# Patient Record
Sex: FEMALE | Race: WHITE | NOT HISPANIC OR LATINO | Employment: FULL TIME | URBAN - METROPOLITAN AREA
[De-identification: names, ages, dates, MRNs, and addresses within clinical notes are randomized per-mention and may not be internally consistent; named-entity substitution may affect disease eponyms.]

---

## 2019-01-25 ENCOUNTER — HOSPITAL ENCOUNTER (EMERGENCY)
Facility: CLINIC | Age: 44
Discharge: HOME | End: 2019-01-25
Attending: PHYSICIAN ASSISTANT | Admitting: PHYSICIAN ASSISTANT
Payer: COMMERCIAL

## 2019-01-25 VITALS
SYSTOLIC BLOOD PRESSURE: 151 MMHG | TEMPERATURE: 98 F | OXYGEN SATURATION: 99 % | BODY MASS INDEX: 33.12 KG/M2 | HEIGHT: 64 IN | DIASTOLIC BLOOD PRESSURE: 85 MMHG | RESPIRATION RATE: 16 BRPM | WEIGHT: 194 LBS

## 2019-01-25 DIAGNOSIS — J06.9 VIRAL URI WITH COUGH: ICD-10-CM

## 2019-01-25 DIAGNOSIS — R07.0 THROAT PAIN: ICD-10-CM

## 2019-01-25 DIAGNOSIS — R49.0 VOICE HOARSENESS: ICD-10-CM

## 2019-01-25 LAB
INTERNAL QC OK POCT: YES
S PYO AG THROAT QL IA.RAPID: NEGATIVE

## 2019-01-25 PROCEDURE — 99214 OFFICE O/P EST MOD 30 MIN: CPT | Mod: Z6 | Performed by: PHYSICIAN ASSISTANT

## 2019-01-25 PROCEDURE — G0463 HOSPITAL OUTPT CLINIC VISIT: HCPCS | Performed by: PHYSICIAN ASSISTANT

## 2019-01-25 PROCEDURE — 87880 STREP A ASSAY W/OPTIC: CPT | Performed by: PHYSICIAN ASSISTANT

## 2019-01-25 PROCEDURE — 87081 CULTURE SCREEN ONLY: CPT | Performed by: PHYSICIAN ASSISTANT

## 2019-01-25 ASSESSMENT — ENCOUNTER SYMPTOMS
SORE THROAT: 1
VOMITING: 0
SHORTNESS OF BREATH: 0
ABDOMINAL PAIN: 0
DIARRHEA: 0
STRIDOR: 0
NAUSEA: 0
COUGH: 1
FATIGUE: 1
WHEEZING: 0

## 2019-01-25 ASSESSMENT — MIFFLIN-ST. JEOR: SCORE: 1517.04

## 2019-01-25 NOTE — LETTER
January 25, 2019      To Whom It May Concern:      Emilie Kruse was seen in our urgent care department today, 01/25/19. Please excuse her from work tomorrow due to illness. Patient can return to work on 1/27/19 as long as she is fever.     Sincerely,        Meghan Hernandez PA-C

## 2019-01-25 NOTE — ED AVS SNAPSHOT
[facility] Emergency Department  [address] [address]  Phone:  [phone]  Fax:  [phone]                                    Emilie Kruse   MRN: [id number]    Department:  [facility] Emergency Department   Date of Visit:  1/25/2019           After Visit Summary Signature Page    I have received my discharge instructions, and my questions have been answered. I have discussed any challenges I see with this plan with the nurse or doctor.    ..........................................................................................................................................  Patient/Patient Representative Signature      ..........................................................................................................................................  Patient Representative Print Name and Relationship to Patient    ..................................................               ................................................  Date                                   Time    ..........................................................................................................................................  Reviewed by Signature/Title    ...................................................              ..............................................  Date                                               Time          22EPIC Rev 08/18

## 2019-01-26 NOTE — ED PROVIDER NOTES
History   No chief complaint on file.    [facility]  Emilie Kruse is a 43 year old female who presents to the urgent care with     ENT Symptoms             Symptoms: cc Present Absent Comment   Fever/Chills   x    Fatigue  x     Muscle Aches   x    Eye Irritation   x    Sneezing   x    Nasal Dom/Drg   x    Sinus Pressure/Pain   x    Loss of smell   x    Dental pain   x    Sore Throat  x  With hoarse voice    Swollen Glands   x    Ear Pain/Fullness  x  Fullness    Cough  x  dry   Wheeze   x    Chest Pain   x    Shortness of breath   x    Rash   x    Other          Symptom duration:  2 weeks    Symptom severity:  mild    Treatments tried:  over the counter medications, tylenol/ibuprofen   Contacts:  patient works at a .        Problem list, Medication list, Allergies, and Medical/Social/Surgical histories reviewed in [facility] and updated as appropriate.        Allergies:  Allergies   Allergen Reactions     Percocet [Oxycodone-Acetaminophen]        Problem List:    There are no active problems to display for this patient.       Past Medical History:    History reviewed. No pertinent past medical history.    Past Surgical History:    History reviewed. No pertinent surgical history.    Family History:    No family history on file.    Social History:  Marital Status:   [2]  Social History     Tobacco Use     Smoking status: Never Smoker   Substance Use Topics     Alcohol use: No     Drug use: No        Medications:      albuterol (PROAIR HFA/PROVENTIL HFA/VENTOLIN HFA) 108 (90 BASE) MCG/ACT Inhaler   ASPIRIN NOT PRESCRIBED, INTENTIONAL,   LISINOPRIL PO   metformin (GLUCOPHAGE) 1000 MG tablet         Review of Systems   Constitutional: Positive for fatigue.   HENT: Positive for sore throat.         Hoarse voice    Respiratory: Positive for cough. Negative for shortness of breath, wheezing and stridor.    Cardiovascular: Negative for chest pain.   Gastrointestinal: Negative for abdominal pain, diarrhea, nausea  "and vomiting.   Skin: Negative for rash.   All other systems reviewed and are negative.      Physical Exam   BP: 151/85  Heart Rate: 77  Temp: 98  F (36.7  C)  Resp: 16  Height: 161.3 cm (5' 3.5\")  Weight: 88 kg (194 lb)  SpO2: 99 %      Physical Exam     Constitutional: healthy, alert, no distress, cooperative and smiling   Cardiovascular: RRR. No murmurs, clicks gallops or rub  Respiratory: Lungs clear to auscultation bilaterally. No rales, rhonchi, wheezing appreciated. No accessory muscle use or retractions.   ENT: bilateral TM normal without fluid or infection, neck has bilateral anterior cervical nodes enlarged, pharynx erythematous without exudate, sinuses nontender, post nasal drip noted and nasal mucosa congested. Positive hoarse voice.   Abdomen: Abdomen soft, non-tender. BS normal. No masses, organomegaly  SKIN: no suspicious lesions or rashes    Results for orders placed or performed during the hospital encounter of 01/25/19 (from the past 24 hour(s))   Rapid strep group A screen POCT   Result Value Ref [facility]    Rapid Strep A Screen negative neg    Internal QC OK Yes          ED Course        Procedures              Critical Care time:  none               Results for orders placed or performed during the hospital encounter of 01/25/19 (from the past 24 hour(s))   Rapid strep group A screen POCT   Result Value Ref [facility]    Rapid Strep A Screen negative neg    Internal QC OK Yes        Medications - No data to display    Assessments & Plan (with Medical Decision Making)     I have reviewed the nursing notes.    I have reviewed the findings, diagnosis, plan and need for follow up with the patient.    49-year-old female-year-old male presents the urgent care with 43-year-old female presents the urgent care with sore throat, hoarse voice, and cough.  The past 2 weeks.  Patient is a  provider.  Patient denies any fevers, shortness of breath, wheezing, abdominal pain, nausea or vomiting, rash, headaches, " sinus pain or pressure.  Rapid strep was obtained in office and was negative currently pending.  See exam findings above.  Patient informed that this is likely negative and no antibiotics indicated at this time.  Symptomatic treatment discussed with patient and given on discharge paperwork.  Patient return if symptoms worsen or change, or fevers occur.       Medication List      There are no discharge medications for this visit.         Final diagnoses:   Voice hoarseness   Throat pain   Viral URI with cough       1/25/2019   [facility] EMERGENCY DEPARTMENT    Meghan Hernandez PA-C  01/25/19 2020

## 2019-01-26 NOTE — DISCHARGE INSTRUCTIONS
Reassurance given to patient.  No evidence for bacterial etiology at this time. Throat culture pending.  Patient informed to rest, warm compresses over sinuses as needed, stay hydrated, cool humidifier, salt water gargles.  Lots of rest and fluids.  Tylenol or ibuprofen as needed.  OTC cough/cold product of patient's choice PRN and fluids and rest  Return if any worsening symptoms or if not improving.  Go to Emergency Room if SOB, chest pain, painful breathing, worst headache of life, active abdominal pain, or fevers > 104F occur.     Patient voiced understanding of instructions given.

## 2019-01-27 LAB
BACTERIA SPEC CULT: NORMAL
Lab: NORMAL
SPECIMEN SOURCE: NORMAL

## 2019-01-27 NOTE — RESULT ENCOUNTER NOTE
Final Beta strep group A r/o culture is NEGATIVE for Group A streptococcus.    No treatment or change in treatment per [facility]sie Strep protocol.

## 2020-11-14 ENCOUNTER — HEALTH MAINTENANCE LETTER (OUTPATIENT)
Age: 45
End: 2020-11-14

## 2021-01-06 ENCOUNTER — VIRTUAL VISIT (OUTPATIENT)
Dept: FAMILY MEDICINE | Facility: OTHER | Age: 46
End: 2021-01-06

## 2021-01-07 DIAGNOSIS — Z20.822 SUSPECTED COVID-19 VIRUS INFECTION: Primary | ICD-10-CM

## 2021-01-07 NOTE — PROGRESS NOTES
"Date: 2021 18:56:39  Clinician: Fantasma Gamble  Clinician NPI: [id number]  Patient: Emilie Zepeda  Patient : 1975  Patient Address: [address] [place] [id number]  Patient Phone: [phone]  Visit Protocol: URI  Patient Summary:  Emilie is a 45 year old ( : 1975 ) female who initiated a [facility] Visit for COVID-19 (Coronavirus) evaluation and screening. When asked the question \"Please sign me up to receive news, health information and promotions. \", Emilie responded \"No\".    When asked when her symptoms started, Emilie reported that she does not have any symptoms.   She denies having recent facial or sinus surgery in the past 60 days and taking antibiotic medication in the past month.    Pertinent COVID-19 (Coronavirus) information  Emilie does not work or volunteer as healthcare worker or a . In the past 14 days, Emilie has not worked or volunteered at a healthcare facility or group living setting.   In the past 14 days, she also has not lived in a congregate living setting.   Emilie has had a close contact with a laboratory-confirmed COVID-19 patient in the last 14 days. She was exposed at her work. 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   Emilie is not living in the same household with the COVID-19 positive patient. She was in an enclosed space for greater than 15 minutes with the COVID-19 patient.   During the encounter, neither were wearing masks.   Emilie has been tested for COVID-19.      Date(s) of her COVID-19 test as reported by the patient (free text): Early October my son got sent home from school " so I had to use my free saliva test from work.       Result of COVID-19 test as reported by the patient (free text): Negative       Type of test as reported by the patient (free text): Mishel Phan has not received a COVID-19 vaccine.   Pertinent medical history  Emilie has diabetes. She has been told by her provider that her diabetes is in control.   Emilie typically gets a yeast infection when she takes antibiotics. She has not used fluconazole (Diflucan) to treat previous yeast infections.   She has not been told by her provider to avoid NSAIDs.   She denies having immunosuppressive conditions (e.g., chemotherapy, HIV, organ transplant, long-term use of steroids or other immunosuppressive medications, splenectomy). She denies having congestive heart failure and severe COPD. She does not have asthma.   Emilie does not need a return to work/school note.   Emilie does not smoke or use smokeless tobacco.   She denies pregnancy and denies breastfeeding. She has menstruated in the past month.   Weight: 200 lbs    MEDICATIONS: hydrocodone-acetaminophen oral, Falmina (28) oral, celecoxib oral, atorvastatin oral, lisinopril oral, metformin oral, gabapentin oral, ALLERGIES: NKDA  Clinician Response:  Dear Emilie,   Based on your exposure to COVID-19 (coronavirus), we would like to test you for this virus.  1. Please call [phone] to schedule your visit. Explain that you were referred by [facility] to have a COVID-19 test. Be ready to share your [facility] visit ID number.  * If you need to schedule in [facility] please call [phone] or for [employer] [place] employees please call [phone].   * If you need to schedule in the [place] area please call [phone]. [employer] employees call [phone].   The following will serve as your written order for this COVID Test, ordered by me, for the indication of suspected COVID [Z20.828]: The test will be ordered in [employer], our electronic health record, after you are scheduled. It will  show as ordered and authorized by John Moffett MD.  Order: COVID-19 (coronavirus) PCR for ASYMPTOMATIC EXPOSURE testing from [facility].   If you know you have had close contact with someone who tested positive, you should be quarantined for 14 days after this exposure. You should stay in quarantine for the14 days even if the covid test is negative, the optimal time to test after exposure is 5-7 days from the exposure  Quarantine means   What should I do?  For safety, it's very important to follow these rules. Do this for 14 days after the date you were last exposed to the virus..  Stay home and away from others. Don't go to school or anywhere else. Generally quarantine means staying home from work but there are some exceptions to this. Please contact your workplace.   No hugging, kissing or shaking hands.  Don't let anyone visit.  Cover your mouth and nose with a mask, tissue or washcloth to avoid spreading germs.  Wash your hands and face often. Use soap and water.  What are the symptoms of COVID-19?  The most common symptoms are cough, fever and trouble breathing. Less common symptoms include headache, body aches, fatigue (feeling very tired), chills, sore throat, stuffy or runny nose, diarrhea (loose poop), loss of taste or smell, belly pain, and nausea or vomiting (feeling sick to your stomach or throwing up).  After 14 days, if you have still don't have symptoms, you likely don't have this virus.  If you develop symptoms, follow these guidelines.  If you're normally healthy: Please start another [facility] visit to report your symptoms. Go to [email or web address].  If you have a serious health problem (like cancer, heart failure, an organ transplant or kidney disease): Call your specialty clinic. Let them know that you might have COVID-19.  2. When it's time for your COVID test:  Stay at least 6 feet away from others. (If someone will drive you to your test, stay in the backseat, as far away from the  as you can.)  Cover  your mouth and nose with a mask, tissue or washcloth.  Go straight to the testing site. Don't make any stops on the way there or back.  Please note  Caregivers in these groups are at risk for severe illness due to COVID-19:  o People 65 years and older  o People who live in a nursing home or long-term care facility  o People with chronic disease (lung, heart, cancer, diabetes, kidney, liver, immunologic)  o People who have a weakened immune system, including those who:  Are in cancer treatment  Take medicine that weakens the immune system, such as corticosteroids  Had a bone marrow or organ transplant  Have an immune deficiency  Have poorly controlled HIV or AIDS  Are obese (body mass index of 40 or higher)  Smoke regularly  Where can I get more information?   [facility] -- About COVID-19: [email or web address]  CDC -- What to Do If You're Sick: www.cdc.gov/coronavirus/2019-ncov/about/steps-when-sick.html  [facility] -- Ending Home Isolation: www.cdc.gov/coronavirus/2019-ncov/hcp/disposition-in-home-patients.html  [facility] -- Caring for Someone: www.cdc.gov/coronavirus/2019-ncov/if-you-are-sick/care-for-someone.html  [facility] -- Interim Guidance for Hospital Discharge to Home: www.health.[facility].[place]/diseases/coronavirus/hcp/hospdischarge.pdf  [facility] clinical trials (COVID-19 research studies): clinicalaffairs.[facility]-clinical-trials  Below are the COVID-19 hotlines at the [facility] ([facility]). Interpreters are available.  For health questions: Call 511-968-3675 or 1-493.837.4761 (7 a.m. to 7 p.m.)  For questions about schools and childcare: Call 146-650-2662 or 1-783.839.3259 (7 a.m. to 7 p.m.)    Diagnosis: Contact with and (suspected) exposure to other viral communicable diseases  Diagnosis ICD: Z20.828  Additional Clinician Notes:  Please do not schedule your test until 4-7 days have passed since you were exposed to the positive tested individual or you may be tested too early  and have a false negative test result.&nbsp;

## 2021-01-10 DIAGNOSIS — Z20.822 ENCOUNTER FOR LABORATORY TESTING FOR COVID-19 VIRUS: Primary | ICD-10-CM

## 2021-01-10 DIAGNOSIS — Z20.822 SUSPECTED COVID-19 VIRUS INFECTION: ICD-10-CM

## 2021-01-10 LAB
SARS-COV-2 RNA RESP QL NAA+PROBE: NORMAL
SPECIMEN SOURCE: NORMAL

## 2021-01-10 PROCEDURE — U0005 INFEC AGEN DETEC AMPLI PROBE: HCPCS | Performed by: FAMILY MEDICINE

## 2021-01-10 PROCEDURE — U0003 INFECTIOUS AGENT DETECTION BY NUCLEIC ACID (DNA OR RNA); SEVERE ACUTE RESPIRATORY SYNDROME CORONAVIRUS 2 (SARS-COV-2) (CORONAVIRUS DISEASE [COVID-19]), AMPLIFIED PROBE TECHNIQUE, MAKING USE OF HIGH THROUGHPUT TECHNOLOGIES AS DESCRIBED BY CMS-2020-01-R: HCPCS | Performed by: FAMILY MEDICINE

## 2021-01-11 ENCOUNTER — TELEPHONE (OUTPATIENT)
Dept: FAMILY MEDICINE | Facility: CLINIC | Age: 46
End: 2021-01-11

## 2021-01-11 LAB
LABORATORY COMMENT REPORT: ABNORMAL
SARS-COV-2 RNA RESP QL NAA+PROBE: POSITIVE
SPECIMEN SOURCE: ABNORMAL

## 2021-01-11 NOTE — TELEPHONE ENCOUNTER
"Coronavirus (COVID-19) Notification    Caller Name (Patient, parent, daughter/son, grandparent, etc)  Emilie Kruse    Reason for call  Notify of Positive Coronavirus (COVID-19) lab results, assess symptoms,  review  [facility] recommendations    Lab Result    Lab test:  2019-nCoV rRt-PCR or SARS-CoV-2 PCR    Oropharyngeal AND/OR nasopharyngeal swabs is POSITIVE for 2019-nCoV RNA/SARS-COV-2 PCR (COVID-19 virus)    RN Recommendations/Instructions per [facility] Coronavirus COVID-19 recommendations    Brief introduction script  Introduce self then review script:  \"I am calling on behalf of [employer].  We were notified that your Coronavirus test (COVID-19) for was POSITIVE for the virus.  I have some information to relay to you but first I wanted to mention that the [place] Dept of Health will be contacting you shortly [it's possible MD already called Patient] to talk to you more about how you are feeling and other people you have had contact with who might now also have the virus.  Also,  [employer] is Partnering with the [facility] for Covid-19 research, you may be contacted directly by research staff.\"    Assessment (Inquire about Patient's current symptoms)   Assessment   Current Symptoms at time of phone call: (if no symptoms, document No symptoms] None    Symptoms onset (if applicable) 1/10/2021     If at time of call, Patients symptoms hare worsened, the Patient should contact 911 or have someone drive them to Emergency Dept promptly:      If Patient calling 911, inform 911 personal that you have tested positive for the Coronavirus (COVID-19).  Place mask on and await 911 to arrive.    If Emergency Dept, If possible, please have another adult drive you to the Emergency Dept but you need to wear mask when in contact with other people.      Monoclonal Antibody Administration    You may be eligible to receive a new treatment with a monoclonal antibody for preventing hospitalization in " "patients at high risk for complications from COVID-19.   This medication is still experimental and available on a limited basis; it is given through an IV and must be given at an infusion center. Please note that not all people who are eligible will receive the medication since it is in limited supply.     Are you interested in being considered for this medication?  No.   Does the patient fit the criteria: Patient declined    If patient qualifies based on above criteria:  \"We will contact you if you are selected to receive the medication in the next 1-2 days.   This is time sensitive and if you are not selected in the next 1-2 days, you will not receive the medication.  If you do not receive a call to schedule, you have not been selected.\"    Review information with Patient    Your result was positive. This means you have COVID-19 (coronavirus).  We have sent you a letter that reviews the information that I'll be reviewing with you now.    How can I protect others?    If you have symptoms: stay home and away from others (self-isolate) until:    You've had no fever--and no medicine that reduces fever--for 1 full day (24 hours). And       Your other symptoms have gotten better. For example, your cough or breathing has improved. And     At least 10 days have passed since your symptoms started. (If you've been told by a doctor that you have a weak immune system, wait 20 days.)     If you don't have symptoms: Stay home and away from others (self-isolate) until at least 10 days have passed since your first positive COVID-19 test. (Date test collected)    During this time:    Stay in your own room, including for meals. Use your own bathroom if you can.    Stay away from others in your home. No hugging, kissing or shaking hands. No visitors.     Don't go to work, school or anywhere else.     Clean  high touch  surfaces often (doorknobs, counters, handles, etc.). Use a household cleaning spray or wipes. You'll find a full " list on the EPA website at www.epa.gov/pesticide-registration/list-n-disinfectants-use-against-sars-cov-2.     Cover your mouth and nose with a mask, tissue or other face covering to avoid spreading germs.    Wash your hands and face often with soap and water.    Caregivers in these groups are at risk for severe illness due to COVID-19:  o People 65 years and older  o People who live in a nursing home or long-term care facility  o People with chronic disease (lung, heart, cancer, diabetes, kidney, liver, immunologic)  o People who have a weakened immune system, including those who:  - Are in cancer treatment  - Take medicine that weakens the immune system, such as corticosteroids  - Had a bone marrow or organ transplant  - Have an immune deficiency  - Have poorly controlled HIV or AIDS  - Are obese (body mass index of 40 or higher)  - Smoke regularly    Caregivers should wear gloves while washing dishes, handling laundry and cleaning bedrooms and bathrooms.    Wash and dry laundry with special caution. Don't shake dirty laundry, and use the warmest water setting you can.    If you have a weakened immune system, ask your doctor about other actions you should take.    For more tips, go to www.cdc.gov/coronavirus/2019-ncov/downloads/10Things.pdf.    You should not go back to work until you meet the guidelines above for ending your home isolation. You don't need to be retested for COVID-19 before going back to work--studies show that you won't spread the virus if it's been at least 10 days since your symptoms started (or 20 days, if you have a weak immune system).    Employers: This document serves as formal notice of your employee's medical guidelines for going back to work. They must meet the above guidelines before going back to work in person.    How can I take care of myself?    1. Get lots of rest. Drink extra fluids (unless a doctor has told you not to).    2. Take Tylenol (acetaminophen) for fever or pain. If  you have liver or kidney problems, ask your family doctor if it's okay to take Tylenol.     Take either:     650 mg (two 325 mg pills) every 4 to 6 hours, or     1,000 mg (two 500 mg pills) every 8 hours as needed.     Note: Don't take more than 3,000 mg in one day. Acetaminophen is found in many medicines (both prescribed and over-the-counter medicines). Read all labels to be sure you don't take too much.    For children, check the Tylenol bottle for the right dose (based on their age or weight).    3. If you have other health problems (like cancer, heart failure, an organ transplant or severe kidney disease): Call your specialty clinic if you don't feel better in the next 2 days.    4. Know when to call 911: Emergency warning signs include:    Trouble breathing or shortness of breath    Pain or pressure in the chest that doesn't go away    Feeling confused like you haven't felt before, or not being able to wake up    Bluish-colored lips or face    5. Sign up for [employer]. We know it's scary to hear that you have COVID-19. We want to track your symptoms to make sure you're okay over the next 2 weeks. Please look for an email from [employer]--this is a free, online program that we'll use to keep in touch. To sign up, follow the link in the email. Learn more at [email or web address].    Where can I get more information?    [facility]: [email or web address]    Coronavirus Basics: www.health.[facility].mn.us/diseases/coronavirus/basics.html    What to Do If You're Sick: www.cdc.gov/coronavirus/2019-ncov/about/steps-when-sick.html    Ending Home Isolation: www.cdc.gov/coronavirus/2019-ncov/hcp/disposition-in-home-patients.html     Caring for Someone with COVID-19: www.cdc.gov/coronavirus/2019-ncov/if-you-are-sick/care-for-someone.html     [facility] clinical trials (COVID-19 research studies): clinicalaffairs.[facility]/umn-clinical-trials     A Positive COVID-19 letter will be sent via  [facility] or the mail. (Exception, no letters sent to Presurgerical/Preprocedure Patients)    Angie Vidal LPN

## 2021-04-11 ENCOUNTER — APPOINTMENT (OUTPATIENT)
Dept: GENERAL RADIOLOGY | Facility: CLINIC | Age: 46
End: 2021-04-11
Attending: FAMILY MEDICINE
Payer: COMMERCIAL

## 2021-04-11 ENCOUNTER — HOSPITAL ENCOUNTER (EMERGENCY)
Facility: CLINIC | Age: 46
Discharge: HOME | End: 2021-04-11
Attending: FAMILY MEDICINE | Admitting: FAMILY MEDICINE
Payer: COMMERCIAL

## 2021-04-11 VITALS
BODY MASS INDEX: 34.87 KG/M2 | DIASTOLIC BLOOD PRESSURE: 81 MMHG | SYSTOLIC BLOOD PRESSURE: 153 MMHG | WEIGHT: 200 LBS | TEMPERATURE: 98.4 F | HEART RATE: 100 BPM | RESPIRATION RATE: 18 BRPM | OXYGEN SATURATION: 96 %

## 2021-04-11 DIAGNOSIS — R11.2 NAUSEA AND VOMITING, INTRACTABILITY OF VOMITING NOT SPECIFIED, UNSPECIFIED VOMITING TYPE: ICD-10-CM

## 2021-04-11 DIAGNOSIS — G89.18 POSTOPERATIVE PAIN: ICD-10-CM

## 2021-04-11 DIAGNOSIS — R79.89 ELEVATED LFTS: ICD-10-CM

## 2021-04-11 PROBLEM — M43.10 SPONDYLOLISTHESIS: Status: ACTIVE | Noted: 2021-04-07

## 2021-04-11 PROBLEM — R87.619 ATYPICAL GLANDULAR CELLS ON CERVICAL PAP SMEAR: Status: ACTIVE | Noted: 2017-04-21

## 2021-04-11 PROBLEM — M48.062 SPINAL STENOSIS, LUMBAR REGION, WITH NEUROGENIC CLAUDICATION: Status: ACTIVE | Noted: 2021-04-07

## 2021-04-11 LAB
ALBUMIN SERPL-MCNC: 3 G/DL (ref 3.4–5)
ALBUMIN UR-MCNC: NEGATIVE MG/DL
ALBUMIN UR-MCNC: NEGATIVE MG/DL
ALP SERPL-CCNC: 279 U/L (ref 40–150)
ALT SERPL W P-5'-P-CCNC: 688 U/L (ref 0–50)
ANION GAP SERPL CALCULATED.3IONS-SCNC: 5 MMOL/L (ref 3–14)
APPEARANCE UR: ABNORMAL
APPEARANCE UR: CLEAR
AST SERPL W P-5'-P-CCNC: 305 U/L (ref 0–45)
BACTERIA #/AREA URNS HPF: ABNORMAL /HPF
BASOPHILS # BLD AUTO: 0 10E9/L (ref 0–0.2)
BASOPHILS NFR BLD AUTO: 0.3 %
BILIRUB SERPL-MCNC: 2.1 MG/DL (ref 0.2–1.3)
BILIRUB UR QL STRIP: NEGATIVE
BILIRUB UR QL STRIP: NEGATIVE
BUN SERPL-MCNC: 14 MG/DL (ref 7–30)
CALCIUM SERPL-MCNC: 8.5 MG/DL (ref 8.5–10.1)
CHLORIDE SERPL-SCNC: 106 MMOL/L (ref 94–109)
CO2 SERPL-SCNC: 26 MMOL/L (ref 20–32)
COLOR UR AUTO: YELLOW
COLOR UR AUTO: YELLOW
CREAT SERPL-MCNC: 0.61 MG/DL (ref 0.52–1.04)
DIFFERENTIAL METHOD BLD: NORMAL
EOSINOPHIL # BLD AUTO: 0.1 10E9/L (ref 0–0.7)
EOSINOPHIL NFR BLD AUTO: 0.8 %
ERYTHROCYTE [DISTWIDTH] IN BLOOD BY AUTOMATED COUNT: 11.7 % (ref 10–15)
GFR SERPL CREATININE-BSD FRML MDRD: >90 ML/MIN/{1.73_M2}
GLUCOSE SERPL-MCNC: 180 MG/DL (ref 70–99)
GLUCOSE UR STRIP-MCNC: 50 MG/DL
GLUCOSE UR STRIP-MCNC: NEGATIVE MG/DL
HCT VFR BLD AUTO: 36.4 % (ref 35–47)
HGB BLD-MCNC: 12.8 G/DL (ref 11.7–15.7)
HGB UR QL STRIP: ABNORMAL
HGB UR QL STRIP: ABNORMAL
IMM GRANULOCYTES # BLD: 0.1 10E9/L (ref 0–0.4)
IMM GRANULOCYTES NFR BLD: 0.6 %
INR PPP: 1.04 (ref 0.86–1.14)
KETONES UR STRIP-MCNC: NEGATIVE MG/DL
KETONES UR STRIP-MCNC: NEGATIVE MG/DL
LACTATE BLD-SCNC: 1.5 MMOL/L (ref 0.7–2)
LEUKOCYTE ESTERASE UR QL STRIP: NEGATIVE
LEUKOCYTE ESTERASE UR QL STRIP: NEGATIVE
LYMPHOCYTES # BLD AUTO: 1.2 10E9/L (ref 0.8–5.3)
LYMPHOCYTES NFR BLD AUTO: 13 %
MCH RBC QN AUTO: 31.9 PG (ref 26.5–33)
MCHC RBC AUTO-ENTMCNC: 35.2 G/DL (ref 31.5–36.5)
MCV RBC AUTO: 91 FL (ref 78–100)
MONOCYTES # BLD AUTO: 0.7 10E9/L (ref 0–1.3)
MONOCYTES NFR BLD AUTO: 7.4 %
MUCOUS THREADS #/AREA URNS LPF: PRESENT /LPF
NEUTROPHILS # BLD AUTO: 6.9 10E9/L (ref 1.6–8.3)
NEUTROPHILS NFR BLD AUTO: 77.9 %
NITRATE UR QL: NEGATIVE
NITRATE UR QL: NEGATIVE
NRBC # BLD AUTO: 0 10*3/UL
NRBC BLD AUTO-RTO: 0 /100
PH UR STRIP: 7 PH (ref 5–7)
PH UR STRIP: 8 PH (ref 5–7)
PLATELET # BLD AUTO: 290 10E9/L (ref 150–450)
POTASSIUM SERPL-SCNC: 3.7 MMOL/L (ref 3.4–5.3)
PROT SERPL-MCNC: 6.8 G/DL (ref 6.8–8.8)
RBC # BLD AUTO: 4.01 10E12/L (ref 3.8–5.2)
RBC #/AREA URNS AUTO: 0 /HPF (ref 0–2)
RBC #/AREA URNS AUTO: 2 /HPF (ref 0–2)
SODIUM SERPL-SCNC: 137 MMOL/L (ref 133–144)
SOURCE: ABNORMAL
SOURCE: ABNORMAL
SP GR UR STRIP: 1 (ref 1–1.03)
SP GR UR STRIP: 1.01 (ref 1–1.03)
SQUAMOUS #/AREA URNS AUTO: 1 /HPF (ref 0–1)
SQUAMOUS #/AREA URNS AUTO: 8 /HPF (ref 0–1)
UROBILINOGEN UR STRIP-MCNC: 0 MG/DL (ref 0–2)
UROBILINOGEN UR STRIP-MCNC: 4 MG/DL (ref 0–2)
WBC # BLD AUTO: 8.9 10E9/L (ref 4–11)
WBC #/AREA URNS AUTO: 1 /HPF (ref 0–5)
WBC #/AREA URNS AUTO: 6 /HPF (ref 0–5)

## 2021-04-11 PROCEDURE — 85610 PROTHROMBIN TIME: CPT | Performed by: FAMILY MEDICINE

## 2021-04-11 PROCEDURE — 87040 BLOOD CULTURE FOR BACTERIA: CPT | Performed by: FAMILY MEDICINE

## 2021-04-11 PROCEDURE — 93005 ELECTROCARDIOGRAM TRACING: CPT | Performed by: FAMILY MEDICINE

## 2021-04-11 PROCEDURE — 71046 X-RAY EXAM CHEST 2 VIEWS: CPT

## 2021-04-11 PROCEDURE — 99285 EMERGENCY DEPT VISIT HI MDM: CPT | Mod: 25 | Performed by: FAMILY MEDICINE

## 2021-04-11 PROCEDURE — 96374 THER/PROPH/DIAG INJ IV PUSH: CPT | Performed by: FAMILY MEDICINE

## 2021-04-11 PROCEDURE — 81001 URINALYSIS AUTO W/SCOPE: CPT | Performed by: FAMILY MEDICINE

## 2021-04-11 PROCEDURE — 80053 COMPREHEN METABOLIC PANEL: CPT | Performed by: FAMILY MEDICINE

## 2021-04-11 PROCEDURE — 85025 COMPLETE CBC W/AUTO DIFF WBC: CPT | Performed by: FAMILY MEDICINE

## 2021-04-11 PROCEDURE — 93010 ELECTROCARDIOGRAM REPORT: CPT | Performed by: FAMILY MEDICINE

## 2021-04-11 PROCEDURE — 87086 URINE CULTURE/COLONY COUNT: CPT | Performed by: FAMILY MEDICINE

## 2021-04-11 PROCEDURE — 250N000011 HC RX IP 250 OP 636: Performed by: FAMILY MEDICINE

## 2021-04-11 PROCEDURE — 258N000003 HC RX IP 258 OP 636: Performed by: FAMILY MEDICINE

## 2021-04-11 PROCEDURE — 83605 ASSAY OF LACTIC ACID: CPT | Performed by: FAMILY MEDICINE

## 2021-04-11 PROCEDURE — 96361 HYDRATE IV INFUSION ADD-ON: CPT | Performed by: FAMILY MEDICINE

## 2021-04-11 RX ORDER — SODIUM CHLORIDE, SODIUM LACTATE, POTASSIUM CHLORIDE, CALCIUM CHLORIDE 600; 310; 30; 20 MG/100ML; MG/100ML; MG/100ML; MG/100ML
INJECTION, SOLUTION INTRAVENOUS CONTINUOUS
Status: DISCONTINUED | OUTPATIENT
Start: 2021-04-11 | End: 2021-04-11 | Stop reason: HOSPADM

## 2021-04-11 RX ORDER — ONDANSETRON 4 MG/1
4 TABLET, ORALLY DISINTEGRATING ORAL EVERY 8 HOURS PRN
Qty: 20 TABLET | Refills: 0 | Status: SHIPPED | OUTPATIENT
Start: 2021-04-11 | End: 2021-04-14

## 2021-04-11 RX ORDER — OXYCODONE HYDROCHLORIDE 5 MG/1
5-15 TABLET ORAL EVERY 6 HOURS PRN
Qty: 20 TABLET | Refills: 0 | Status: SHIPPED | OUTPATIENT
Start: 2021-04-11 | End: 2024-03-07

## 2021-04-11 RX ORDER — ONDANSETRON 2 MG/ML
4 INJECTION INTRAMUSCULAR; INTRAVENOUS
Status: COMPLETED | OUTPATIENT
Start: 2021-04-11 | End: 2021-04-11

## 2021-04-11 RX ADMIN — ONDANSETRON 4 MG: 2 INJECTION INTRAMUSCULAR; INTRAVENOUS at 11:45

## 2021-04-11 RX ADMIN — SODIUM CHLORIDE, POTASSIUM CHLORIDE, SODIUM LACTATE AND CALCIUM CHLORIDE 1000 ML: 600; 310; 30; 20 INJECTION, SOLUTION INTRAVENOUS at 11:45

## 2021-04-11 NOTE — DISCHARGE INSTRUCTIONS
Push fluids, rest.  Stop taking Tylenol.  You should have a recheck of your liver function tests in 1 to 2 weeks with your primary care provider.  Oxycodone as we discussed may be increased to 5-15 mg every 6 hours as needed.  Call and check with your surgeon as planned tomorrow.  Return to the emergency department in the interim if worse or changes.

## 2021-04-11 NOTE — ED TRIAGE NOTES
Nausea/vomiting and fever since last night.  Patient had spinal fusion on Wednesday and was discharged on Friday.

## 2021-04-11 NOTE — ED PROVIDER NOTES
History     Chief Complaint   Patient presents with     Post-op Problem     Vomiting     HPI  Emilie Kruse is a 45 year old female, past medical history is significant for spondylolisthesis, spinal stenosis, hypercholesterolemia, type 2 diabetes, endometriosis, hypertension, class II severe obesity, who underwent recent anterior lumbar fusion L5-S1, posterior spine fusion L5-S1, bilateral decompression laminectomy L5-S1, instrumentation and bone graft on 4/7/2021 with Dr. Terrence Elam at [facility], and presents now with concerns of vomiting and fever.  History is obtained from the patient who is brought in by her significant other with concerns of nausea yesterday vomiting fever onset yesterday afternoon 100.6.  She denies any cough runny nose or sore throat.  She admits she is not drinking fluids the way she should be and notes decreased urine output.  Her pain has been managed with a combination of Tylenol and oxycodone which she has been able to keep down and reports adequate pain relief.  Has had a couple of showers at home since the time of the surgery.  She called [facility] and was directed to the emergency department here.    Allergies:  Allergies   Allergen Reactions     Percocet [Oxycodone-Acetaminophen]        Problem List:    Patient Active Problem List    Diagnosis Date Noted     Mild or unspecified pre-eclampsia, antepartum 04/11/2021     Priority: Medium     Formatting of this note might be different from the original.  past hx of both pregnancies       Spinal stenosis, lumbar region, with neurogenic claudication 04/07/2021     Priority: Medium     Spondylolisthesis 04/07/2021     Priority: Medium     Atypical glandular cells on cervical Pap smear 04/21/2017     Priority: Medium     Formatting of this note might be different from the original.  Colposcopy, endometrial biosy - May 2017 - negative.  Pap smear, HPV testing in 12 months, 24 months.  If abnormal,  repeat colposcopy.  Formatting of this note might be different from the original.  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  Diagnosed in 2009. On Metformin and lantus.  No microvascular complications       Endometriosis of ovary 05/25/2012     Priority: Medium     Endometrioma 04/04/2012     Priority: Medium     Family history of ovarian cancer 04/04/2012     Priority: Medium     Essential hypertension 10/28/2010     Priority: Medium        Past Medical History:    No past medical history on file.    Past Surgical History:    No past surgical history on file.    Family History:    No family history on file.    Social History:  Marital Status:   [2]  Social History     Tobacco Use     Smoking status: Never Smoker   Substance Use Topics     Alcohol use: No     Drug use: No        Medications:    ondansetron (ZOFRAN ODT) 4 MG ODT tab  oxyCODONE (ROXICODONE) 5 MG tablet  albuterol (PROAIR HFA/PROVENTIL HFA/VENTOLIN HFA) 108 (90 BASE) MCG/ACT Inhaler  ASPIRIN NOT PRESCRIBED, INTENTIONAL,  LISINOPRIL PO  metformin (GLUCOPHAGE) 1000 MG tablet          Review of Systems   All other systems reviewed and are negative.      Physical Exam   BP: (!) 166/87  Pulse: 100  Temp: 98.4  F (36.9  C)  Resp: 18  Weight: 90.7 kg (200 lb)  SpO2: 99 %      Physical Exam  Vitals signs and nursing note reviewed.   Constitutional:       General: She is not in acute distress.     Appearance: Normal appearance. She is obese. She is ill-appearing.   HENT:      Head: Normocephalic and atraumatic.      Right Ear: Tympanic membrane normal.      Left Ear: Tympanic membrane normal.      Nose: Nose normal.      Mouth/Throat:      Mouth: Mucous membranes are  dry.      Pharynx: Oropharynx is clear.   Eyes:      Extraocular Movements: Extraocular movements intact.      Conjunctiva/sclera: Conjunctivae normal.      Pupils: Pupils are equal, round, and reactive to light.   Neck:      Musculoskeletal: Normal range of motion and neck supple.   Cardiovascular:      Rate and Rhythm: Normal rate and regular rhythm.      Pulses: Normal pulses.      Heart sounds: Normal heart sounds.   Pulmonary:      Effort: Pulmonary effort is normal.      Breath sounds: Normal breath sounds.   Abdominal:      General: Bowel sounds are normal.      Palpations: Abdomen is soft.      Comments: Midline lower abdominal incision dressing was removed and the incision inspected and there is no evidence for infection   Musculoskeletal:        Back:    Skin:     General: Skin is warm and dry.      Capillary Refill: Capillary refill takes less than 2 seconds.   Neurological:      General: No focal deficit present.      Mental Status: She is alert and oriented to person, place, and time.   Psychiatric:         Mood and Affect: Mood normal.         Behavior: Behavior normal.         ED Course        Procedures                  EKG Interpretation:      Interpreted by Erasmo Cooper MD  Time reviewed: Time obtained 1126 time interpreted same.  81 bpm, normal sinus rhythm, normal axis, normal intervals, no acute ST-T wave changes.  Impression normal EKG.      Critical Care time:  none               Results for orders placed or performed during the hospital encounter of 04/11/21 (from the past 24 hour(s))   CBC with platelets differential   Result Value Ref Range    WBC 8.9 4.0 - 11.0 10e9/L    RBC Count 4.01 3.8 - 5.2 10e12/L    Hemoglobin 12.8 11.7 - 15.7 g/dL    Hematocrit 36.4 35.0 - 47.0 %    MCV 91 78 - 100 fl    MCH 31.9 26.5 - 33.0 pg    MCHC 35.2 31.5 - 36.5 g/dL    RDW 11.7 10.0 - 15.0 %    Platelet Count 290 150 - 450 10e9/L    Diff Method Automated Method     % Neutrophils 77.9 %    %  Lymphocytes 13.0 %    % Monocytes 7.4 %    % Eosinophils 0.8 %    % Basophils 0.3 %    % Immature Granulocytes 0.6 %    Nucleated RBCs 0 0 /100    Absolute Neutrophil 6.9 1.6 - 8.3 10e9/L    Absolute Lymphocytes 1.2 0.8 - 5.3 10e9/L    Absolute Monocytes 0.7 0.0 - 1.3 10e9/L    Absolute Eosinophils 0.1 0.0 - 0.7 10e9/L    Absolute Basophils 0.0 0.0 - 0.2 10e9/L    Abs Immature Granulocytes 0.1 0 - 0.4 10e9/L    Absolute Nucleated RBC 0.0    INR   Result Value Ref Range    INR 1.04 0.86 - 1.14   Comprehensive metabolic panel   Result Value Ref Range    Sodium 137 133 - 144 mmol/L    Potassium 3.7 3.4 - 5.3 mmol/L    Chloride 106 94 - 109 mmol/L    Carbon Dioxide 26 20 - 32 mmol/L    Anion Gap 5 3 - 14 mmol/L    Glucose 180 (H) 70 - 99 mg/dL    Urea Nitrogen 14 7 - 30 mg/dL    Creatinine 0.61 0.52 - 1.04 mg/dL    GFR Estimate >90 >60 mL/min/[1.73_m2]    GFR Estimate If Black >90 >60 mL/min/[1.73_m2]    Calcium 8.5 8.5 - 10.1 mg/dL    Bilirubin Total 2.1 (H) 0.2 - 1.3 mg/dL    Albumin 3.0 (L) 3.4 - 5.0 g/dL    Protein Total 6.8 6.8 - 8.8 g/dL    Alkaline Phosphatase 279 (H) 40 - 150 U/L     (HH) 0 - 50 U/L     (H) 0 - 45 U/L   Lactic acid whole blood   Result Value Ref Range    Lactic Acid 1.5 0.7 - 2.0 mmol/L   Blood culture    Specimen: Blood    Right Arm   Result Value Ref Range    Specimen Description Blood Right Arm     Culture Micro No growth after 1 hour    UA reflex to Microscopic   Result Value Ref Range    Color Urine Yellow     Appearance Urine Slightly Cloudy     Glucose Urine 50 (A) NEG^Negative mg/dL    Bilirubin Urine Negative NEG^Negative    Ketones Urine Negative NEG^Negative mg/dL    Specific Gravity Urine 1.011 1.003 - 1.035    Blood Urine Large (A) NEG^Negative    pH Urine 7.0 5.0 - 7.0 pH    Protein Albumin Urine Negative NEG^Negative mg/dL    Urobilinogen mg/dL 4.0 (H) 0.0 - 2.0 mg/dL    Nitrite Urine Negative NEG^Negative    Leukocyte Esterase Urine Negative NEG^Negative    Source  Midstream Urine     RBC Urine 2 0 - 2 /HPF    WBC Urine 6 (H) 0 - 5 /HPF    Bacteria Urine Few (A) NEG^Negative /HPF    Squamous Epithelial /HPF Urine 8 (H) 0 - 1 /HPF    Mucous Urine Present (A) NEG^Negative /LPF   Urine Culture Aerobic Bacterial    Specimen: Midstream Urine   Result Value Ref Range    Specimen Description Midstream Urine     Special Requests Specimen received in preservative     Culture Micro PENDING    XR Chest 2 Views    Narrative    XR CHEST TWO VIEWS   4/11/2021 12:58 PM     HISTORY: Fever, POD 4.    COMPARISON: None.      Impression    IMPRESSION: PA and lateral views of the chest. Lungs are clear. Heart  is normal in size. No effusions are evident. No pneumothorax.    KITTY BAUTISTA MD   UA reflex to Microscopic   Result Value Ref Range    Color Urine Yellow     Appearance Urine Clear     Glucose Urine Negative NEG^Negative mg/dL    Bilirubin Urine Negative NEG^Negative    Ketones Urine Negative NEG^Negative mg/dL    Specific Gravity Urine 1.005 1.003 - 1.035    Blood Urine Large (A) NEG^Negative    pH Urine 8.0 (H) 5.0 - 7.0 pH    Protein Albumin Urine Negative NEG^Negative mg/dL    Urobilinogen mg/dL 0.0 0.0 - 2.0 mg/dL    Nitrite Urine Negative NEG^Negative    Leukocyte Esterase Urine Negative NEG^Negative    Source Midstream Urine     RBC Urine 0 0 - 2 /HPF    WBC Urine 1 0 - 5 /HPF    Squamous Epithelial /HPF Urine 1 0 - 1 /HPF       Medications   lactated ringers BOLUS 1,000 mL (has no administration in time range)     Followed by   lactated ringers infusion (has no administration in time range)   ondansetron (ZOFRAN) injection 4 mg (4 mg Intravenous Given 4/11/21 1145)   lactated ringers BOLUS 1,000 mL (0 mLs Intravenous Stopped 4/11/21 1402)   3:43 PM  Patient feels better after fluids and antiemetics.  She has no nausea and has been able to eat and drink here in the emergency department.  No recurrence of nausea or vomiting.  Her pain is adequately controlled at present time.  I am  concerned about her LFTs being abnormal.  I suspect that this may be related to the acetaminophen that she is taking on a fairly frequent basis since the time of her surgery and have asked her to stop taking the acetaminophen completely.  Recheck LFTs in 1 to 2 weeks.  Correspondingly I will increase her oxycodone from the 5-10 mg every 6 hours recommended she may go as high as 15 mg every 6 hours if she really needs to.  This should be short-term only as I have stressed to her.  The patient of note does not have any right upper quadrant abdominal pain/epigastric pain and has not at any time.  If her liver function tests do not normalize obviously further evaluation of her liver is warranted and I would pursue ultrasound of the hepatobiliary system at that point.  The repeat urinalysis shows no evidence for infection, her chest x-ray is clear.  She does not have any meningeal signs at this point and did not feel that further evaluation with a spinal tap for consideration of CNS infection is warranted at this point.  I did discuss this with the patient and she agrees.  She notes that when her temperature was 100.6 at home she was in a very hot room she got out of the hot room immediately with the assistance of her  and the temperature was turned down.  She has not felt hot since then and not had a fever since that time.  Patient feels clinically improved and I think it would be reasonable to disposition her home she has a plan to call her surgeon tomorrow.  At the present time I do not have evidence to support infection anywhere and I suspect that the elevated temperature at home which has not been reproduced here was a result of the environmental conditions.  Her nausea and vomiting and pain were treated to her satisfaction in the emergency department.      Assessments & Plan (with Medical Decision Making)   Assessments and plan with medical decision making at the time stamps above.      Disclaimer: This note  consists of symbols derived from keyboarding, dictation and/or voice recognition software. As a result, there may be errors in the script that have gone undetected. Please consider this when interpreting information found in this chart.      I have reviewed the nursing notes.    I have reviewed the findings, diagnosis, plan and need for follow up with the patient.       New Prescriptions    ONDANSETRON (ZOFRAN ODT) 4 MG ODT TAB    Take 1 tablet (4 mg) by mouth every 8 hours as needed    OXYCODONE (ROXICODONE) 5 MG TABLET    Take 1-3 tablets (5-15 mg) by mouth every 6 hours as needed for pain       Final diagnoses:   Postoperative pain   Nausea and vomiting, intractability of vomiting not specified, unspecified vomiting type   Elevated LFTs       4/11/2021   [facility] EMERGENCY DEPT     Erasmo Cooper MD  04/13/21 1520       Erasmo Cooper MD  04/20/21 [id number]

## 2021-04-12 LAB
BACTERIA SPEC CULT: NORMAL
Lab: NORMAL
SPECIMEN SOURCE: NORMAL

## 2021-04-12 NOTE — RESULT ENCOUNTER NOTE
Final urine culture report is negative.  Adult Negative Urine culture parameters: Any # Urogenital single or mixed organism, <10,000 col/ml single organism (cath specimen), and <50,000 col/ml single organism (midstream or cath specimen).  Pediatric Negative urine culture parameters: Any # Urogenital single or mixed organism, <50,000 col/ml single organism (cath specimen), and <100,000 col/ml single organism (midstream or cath specimen).  Treatment recommendations per [facility] ED Lab Result Urine Culture protocol.

## 2021-04-17 LAB
BACTERIA SPEC CULT: NO GROWTH
BACTERIA SPEC CULT: NO GROWTH
SPECIMEN SOURCE: NORMAL
SPECIMEN SOURCE: NORMAL

## 2021-05-23 ENCOUNTER — HEALTH MAINTENANCE LETTER (OUTPATIENT)
Age: 46
End: 2021-05-23

## 2021-07-18 ENCOUNTER — HEALTH MAINTENANCE LETTER (OUTPATIENT)
Age: 46
End: 2021-07-18

## 2021-09-12 ENCOUNTER — HEALTH MAINTENANCE LETTER (OUTPATIENT)
Age: 46
End: 2021-09-12

## 2022-01-02 ENCOUNTER — HEALTH MAINTENANCE LETTER (OUTPATIENT)
Age: 47
End: 2022-01-02

## 2022-04-24 ENCOUNTER — HEALTH MAINTENANCE LETTER (OUTPATIENT)
Age: 47
End: 2022-04-24

## 2022-08-14 ENCOUNTER — HEALTH MAINTENANCE LETTER (OUTPATIENT)
Age: 47
End: 2022-08-14

## 2022-11-19 ENCOUNTER — HEALTH MAINTENANCE LETTER (OUTPATIENT)
Age: 47
End: 2022-11-19

## 2023-04-09 ENCOUNTER — HEALTH MAINTENANCE LETTER (OUTPATIENT)
Age: 48
End: 2023-04-09

## 2023-09-09 ENCOUNTER — HEALTH MAINTENANCE LETTER (OUTPATIENT)
Age: 48
End: 2023-09-09

## 2024-01-27 ENCOUNTER — HEALTH MAINTENANCE LETTER (OUTPATIENT)
Age: 49
End: 2024-01-27

## 2024-03-07 ENCOUNTER — HOSPITAL ENCOUNTER (EMERGENCY)
Facility: CLINIC | Age: 49
Discharge: HOME | End: 2024-03-07
Attending: EMERGENCY MEDICINE | Admitting: EMERGENCY MEDICINE
Payer: COMMERCIAL

## 2024-03-07 VITALS
HEART RATE: 77 BPM | BODY MASS INDEX: 37.52 KG/M2 | OXYGEN SATURATION: 98 % | RESPIRATION RATE: 18 BRPM | TEMPERATURE: 97.5 F | SYSTOLIC BLOOD PRESSURE: 122 MMHG | WEIGHT: 215.2 LBS | DIASTOLIC BLOOD PRESSURE: 85 MMHG

## 2024-03-07 DIAGNOSIS — E16.2 HYPOGLYCEMIA: ICD-10-CM

## 2024-03-07 DIAGNOSIS — Z86.39 H/O INSULIN DEPENDENT DIABETES MELLITUS: ICD-10-CM

## 2024-03-07 DIAGNOSIS — E87.6 HYPOKALEMIA: ICD-10-CM

## 2024-03-07 LAB
ALBUMIN UR-MCNC: NEGATIVE MG/DL
ANION GAP SERPL CALCULATED.3IONS-SCNC: 13 MMOL/L (ref 7–15)
APPEARANCE UR: CLEAR
BILIRUB UR QL STRIP: NEGATIVE
BUN SERPL-MCNC: 17 MG/DL (ref 6–20)
CALCIUM SERPL-MCNC: 9.7 MG/DL (ref 8.6–10)
CHLORIDE SERPL-SCNC: 103 MMOL/L (ref 98–107)
COLOR UR AUTO: YELLOW
CREAT SERPL-MCNC: 0.68 MG/DL (ref 0.51–0.95)
CRP SERPL-MCNC: <3 MG/L
DEPRECATED HCO3 PLAS-SCNC: 24 MMOL/L (ref 22–29)
EGFRCR SERPLBLD CKD-EPI 2021: >90 ML/MIN/1.73M2
GLUCOSE BLDC GLUCOMTR-MCNC: 113 MG/DL (ref 70–99)
GLUCOSE BLDC GLUCOMTR-MCNC: 117 MG/DL (ref 70–99)
GLUCOSE BLDC GLUCOMTR-MCNC: 59 MG/DL (ref 70–99)
GLUCOSE BLDC GLUCOMTR-MCNC: 61 MG/DL (ref 70–99)
GLUCOSE BLDC GLUCOMTR-MCNC: 77 MG/DL (ref 70–99)
GLUCOSE BLDC GLUCOMTR-MCNC: 98 MG/DL (ref 70–99)
GLUCOSE SERPL-MCNC: 61 MG/DL (ref 70–99)
GLUCOSE UR STRIP-MCNC: NEGATIVE MG/DL
HGB UR QL STRIP: NEGATIVE
HOLD SPECIMEN: NORMAL
KETONES UR STRIP-MCNC: NEGATIVE MG/DL
LEUKOCYTE ESTERASE UR QL STRIP: NEGATIVE
MUCOUS THREADS #/AREA URNS LPF: PRESENT /LPF
NITRATE UR QL: NEGATIVE
PH UR STRIP: 5 [PH] (ref 5–7)
POTASSIUM SERPL-SCNC: 3.1 MMOL/L (ref 3.4–5.3)
RBC URINE: 1 /HPF
SODIUM SERPL-SCNC: 140 MMOL/L (ref 135–145)
SP GR UR STRIP: 1.02 (ref 1–1.03)
SQUAMOUS EPITHELIAL: 1 /HPF
UROBILINOGEN UR STRIP-MCNC: NORMAL MG/DL
WBC URINE: <1 /HPF

## 2024-03-07 PROCEDURE — 36415 COLL VENOUS BLD VENIPUNCTURE: CPT | Performed by: EMERGENCY MEDICINE

## 2024-03-07 PROCEDURE — 250N000013 HC RX MED GY IP 250 OP 250 PS 637: Performed by: EMERGENCY MEDICINE

## 2024-03-07 PROCEDURE — 96366 THER/PROPH/DIAG IV INF ADDON: CPT | Performed by: EMERGENCY MEDICINE

## 2024-03-07 PROCEDURE — 86140 C-REACTIVE PROTEIN: CPT | Performed by: EMERGENCY MEDICINE

## 2024-03-07 PROCEDURE — 258N000001 HC RX 258: Performed by: EMERGENCY MEDICINE

## 2024-03-07 PROCEDURE — 80048 BASIC METABOLIC PNL TOTAL CA: CPT | Performed by: EMERGENCY MEDICINE

## 2024-03-07 PROCEDURE — 99284 EMERGENCY DEPT VISIT MOD MDM: CPT | Performed by: EMERGENCY MEDICINE

## 2024-03-07 PROCEDURE — 96365 THER/PROPH/DIAG IV INF INIT: CPT | Performed by: EMERGENCY MEDICINE

## 2024-03-07 PROCEDURE — 82962 GLUCOSE BLOOD TEST: CPT | Mod: 91

## 2024-03-07 PROCEDURE — 81001 URINALYSIS AUTO W/SCOPE: CPT | Performed by: EMERGENCY MEDICINE

## 2024-03-07 PROCEDURE — 99284 EMERGENCY DEPT VISIT MOD MDM: CPT | Mod: 25 | Performed by: EMERGENCY MEDICINE

## 2024-03-07 RX ORDER — HUMAN INSULIN 100 [IU]/ML
36 INJECTION, SUSPENSION SUBCUTANEOUS 2 TIMES DAILY
COMMUNITY
Start: 2023-10-26

## 2024-03-07 RX ORDER — DEXTROSE MONOHYDRATE 100 MG/ML
INJECTION, SOLUTION INTRAVENOUS CONTINUOUS
Status: DISCONTINUED | OUTPATIENT
Start: 2024-03-07 | End: 2024-03-08 | Stop reason: HOSPADM

## 2024-03-07 RX ORDER — POTASSIUM CHLORIDE 1.5 G/1.58G
40 POWDER, FOR SOLUTION ORAL ONCE
Status: COMPLETED | OUTPATIENT
Start: 2024-03-07 | End: 2024-03-07

## 2024-03-07 RX ADMIN — DEXTROSE MONOHYDRATE: 100 INJECTION, SOLUTION INTRAVENOUS at 18:59

## 2024-03-07 RX ADMIN — POTASSIUM CHLORIDE 40 MEQ: 1.5 POWDER, FOR SOLUTION ORAL at 21:32

## 2024-03-07 ASSESSMENT — ACTIVITIES OF DAILY LIVING (ADL)
ADLS_ACUITY_SCORE: 35

## 2024-03-07 ASSESSMENT — ENCOUNTER SYMPTOMS
ENDOCRINE NEGATIVE: 1
CARDIOVASCULAR NEGATIVE: 1
NEUROLOGICAL NEGATIVE: 1
ALLERGIC/IMMUNOLOGIC NEGATIVE: 1
RESPIRATORY NEGATIVE: 1
PSYCHIATRIC NEGATIVE: 1
GASTROINTESTINAL NEGATIVE: 1
MUSCULOSKELETAL NEGATIVE: 1
HEMATOLOGIC/LYMPHATIC NEGATIVE: 1
APPETITE CHANGE: 1

## 2024-03-07 ASSESSMENT — COLUMBIA-SUICIDE SEVERITY RATING SCALE - C-SSRS
1. IN THE PAST MONTH, HAVE YOU WISHED YOU WERE DEAD OR WISHED YOU COULD GO TO SLEEP AND NOT WAKE UP?: NO
2. HAVE YOU ACTUALLY HAD ANY THOUGHTS OF KILLING YOURSELF IN THE PAST MONTH?: NO
6. HAVE YOU EVER DONE ANYTHING, STARTED TO DO ANYTHING, OR PREPARED TO DO ANYTHING TO END YOUR LIFE?: NO

## 2024-03-08 NOTE — DISCHARGE INSTRUCTIONS
1) Your evaluation today did not reveal a cause of your recent low blood glucose readings.  There is no evidence suggest an infection or significant traumatic changes.  We discussed some considerations for adjusting her insulin regimen and to reach her treating endocrinologist- Dr. Harvey.    2) Be sure to call in the morning to review your visit today and our discussion about reducing your midday insulin dose given your blood glucose a bit lower during the midday.  We discussed that if you develop any new concerns you should return to be evaluated.    3) During your visit your potassium is on the low end of normal which was repleted.  See handout provided for common sources for potassium

## 2024-03-08 NOTE — ED TRIAGE NOTES
Arrives from home with hypoglycemia, running low for past couple weeks, decreased intake as she had been caring for  that recently passed. No changes to insulin recently, states type 2, but turning into type 1 per PCP. Denies n/v/d. BG 59 on arrival, had half a hotdog for lunch. Apple juice provided to patient upon rooming.     Triage Assessment (Adult)       Row Name 03/07/24 5620          Triage Assessment    Airway WDL WDL        Respiratory WDL    Respiratory WDL WDL        Skin Circulation/Temperature WDL    Skin Circulation/Temperature WDL WDL        Cardiac WDL    Cardiac WDL WDL        Peripheral/Neurovascular WDL    Peripheral Neurovascular WDL WDL        Cognitive/Neuro/Behavioral WDL    Cognitive/Neuro/Behavioral WDL WDL

## 2024-03-08 NOTE — ED NOTES
Blood sugar 59, patient given juice previously (apple juice x2) patient given 3rd juice, sandwich, and cheese. Blood sugar will continue to be checked Q15min until >70

## 2024-03-08 NOTE — ED PROVIDER NOTES
History     Chief Complaint   Patient presents with    Blood Sugar Problem     Running hypo-59     HPI  Emilie Kruse is a 48 year old female who presents with report about concern about her blood glucose readings over the last few weeks.  Patient on intake noted that her  recently .  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  She has a known history of spondylolisthesis and endometrioma.    Reviewed patient's prescribed medications-currently on NPH insulin 36 units subcu twice daily and regular insulin 30 units subcu 3 times a day.  She is on metformin a gram twice a day and lisinopril.    On examination patient right by car alone from work today.  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  Patient reports last 2 weeks she has had low blood glucose readings.  For about a week it was lower in the morning and in the last week has been lower midday.  Glucoses range from 45-60.  He confirmed that she is been compliant with her insulin regimen where she uses NPH 36 units twice a day and that typically her regular insulin to 30 units 3 in the morning, 20 units in the afternoon and 22 units in evening.  She reported that her   suddenly in 2024 from metastatic neuroendocrine cancer.  She reports that she has been seeking grief counseling and that she has had early satiety and poor appetite.  She has had no fever or chills she has had no urinary symptoms and no rash and no other new medications in the last month.  Reached out to her endocrinology care team and after discussion with the triage nurse it  was recommended she present for further assessment and blood glucose went from 59 to 62.     Allergies:  Allergies   Allergen Reactions    Percocet  [Oxycodone-Acetaminophen]        Problem List:    Patient Active Problem List    Diagnosis Date Noted    Mild or unspecified pre-eclampsia, antepartum 04/11/2021     Priority: Medium     Formatting of this note might be different from the original.  past hx of both pregnancies      Spinal stenosis, lumbar region, with neurogenic claudication 04/07/2021     Priority: Medium    Spondylolisthesis 04/07/2021     Priority: Medium    Atypical glandular cells on cervical Pap smear 04/21/2017     Priority: Medium     Formatting of this note might be different from the original.  Colposcopy, endometrial biosy - May 2017 - negative.  Pap smear, HPV testing in 12 months, 24 months.  If abnormal, repeat colposcopy.  Formatting of this note might be different from the original.  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  Diagnosed in 2009. On Metformin and lantus.  No microvascular complications      Endometriosis of ovary 05/25/2012     Priority: Medium    Endometrioma 04/04/2012     Priority: Medium    Family history of ovarian cancer 04/04/2012     Priority: Medium    Essential hypertension 10/28/2010     Priority: Medium        Past Medical History:    No past medical history on file.    Past Surgical History:    No past surgical history on file.    Family History:    No family history on file.    Social History:  Marital Status:   [2]  Social History     Tobacco Use    Smoking status: Never   Substance Use Topics    Alcohol use: No    Drug use: No        Medications:    insulin NPH (NOVOLIN N VIAL) 100 UNIT/ML vial  insulin regular 100 UNIT/ML vial  ASPIRIN NOT PRESCRIBED, INTENTIONAL,  LISINOPRIL PO  metformin (GLUCOPHAGE) 1000 MG  tablet      Review of Systems   Constitutional:  Positive for appetite change.        My blood glucose has been low the last 2 weeks.   HENT: Negative.     Respiratory: Negative.     Cardiovascular: Negative.    Gastrointestinal: Negative.    Endocrine: Negative.    Genitourinary: Negative.    Musculoskeletal: Negative.    Skin: Negative.    Allergic/Immunologic: Negative.    Neurological: Negative.    Hematological: Negative.    Psychiatric/Behavioral: Negative.     All other systems reviewed and are negative.      Physical Exam   BP: (!) 182/84  Pulse: 82  Temp: 97.5  F (36.4  C)  Resp: 18  Weight: 97.6 kg (215 lb 3.2 oz)  SpO2: 99 %      Physical Exam  Constitutional:       General: She is not in acute distress.     Appearance: Normal appearance. She is not ill-appearing, toxic-appearing or diaphoretic.   HENT:      Head: Normocephalic and atraumatic.      Nose: Nose normal.   Eyes:      Extraocular Movements: Extraocular movements intact.      Pupils: Pupils are equal, round, and reactive to light.   Cardiovascular:      Rate and Rhythm: Normal rate and regular rhythm.   Pulmonary:      Effort: No respiratory distress.      Breath sounds: Normal breath sounds. No wheezing or rhonchi.   Chest:      Chest wall: No tenderness.   Musculoskeletal:         General: No swelling, tenderness, deformity or signs of injury.      Cervical back: Normal range of motion and neck supple.      Right lower leg: No edema.      Left lower leg: No edema.   Skin:     Capillary Refill: Capillary refill takes less than 2 seconds.      Coloration: Skin is not jaundiced or pale.      Findings: No bruising, erythema, lesion or rash.   Neurological:      General: No focal deficit present.      Mental Status: She is alert and oriented to person, place, and time.      Cranial Nerves: No cranial nerve deficit.      Sensory: No sensory deficit.      Motor: No weakness.      Coordination: Coordination normal.      Gait: Gait normal.      Deep  Tendon Reflexes: Reflexes normal.   Psychiatric:         Mood and Affect: Mood normal.         Behavior: Behavior normal.         Thought Content: Thought content normal.         Judgment: Judgment normal.         ED Course        Procedures              Critical Care time:  none               ED medications:  Medications   dextrose 10% infusion (0 mLs Intravenous Stopped 3/7/24 2043)   potassium chloride (KLOR-CON) Packet 40 mEq (40 mEq Oral $Given 3/7/24 2132)       ED Vitals:  Vitals:    03/07/24 1814 03/07/24 1816 03/07/24 2000 03/07/24 2015   BP: (!) 182/84  124/63 133/75   Pulse: 82  72 77   Resp: 18      Temp: 97.5  F (36.4  C)      TempSrc: Tympanic      SpO2: 99%  98% 97%   Weight:  97.6 kg (215 lb 3.2 oz)       Vitals:    03/07/24 2000 03/07/24 2015 03/07/24 2026 03/07/24 2041   BP: 124/63 133/75 137/72 122/85   Pulse: 72 77     Resp:       Temp:       TempSrc:       SpO2: 98% 97% 96% 98%   Weight:          ED labs and imaging:  Results for orders placed or performed during the hospital encounter of 03/07/24   Basic metabolic panel     Status: Abnormal   Result Value Ref Range    Sodium 140 135 - 145 mmol/L    Potassium 3.1 (L) 3.4 - 5.3 mmol/L    Chloride 103 98 - 107 mmol/L    Carbon Dioxide (CO2) 24 22 - 29 mmol/L    Anion Gap 13 7 - 15 mmol/L    Urea Nitrogen 17.0 6.0 - 20.0 mg/dL    Creatinine 0.68 0.51 - 0.95 mg/dL    GFR Estimate >90 >60 mL/min/1.73m2    Calcium 9.7 8.6 - 10.0 mg/dL    Glucose 61 (L) 70 - 99 mg/dL   CRP Inflammation     Status: Normal   Result Value Ref Range    CRP Inflammation <3.00 <5.00 mg/L   UA with Microscopic reflex to Culture     Status: Abnormal    Specimen: Urine, Clean Catch   Result Value Ref Range    Color Urine Yellow Colorless, Straw, Light Yellow, Yellow    Appearance Urine Clear Clear    Glucose Urine Negative Negative mg/dL    Bilirubin Urine Negative Negative    Ketones Urine Negative Negative mg/dL    Specific Gravity Urine 1.020 1.003 - 1.035    Blood Urine  Negative Negative    pH Urine 5.0 5.0 - 7.0    Protein Albumin Urine Negative Negative mg/dL    Urobilinogen Urine Normal Normal, 2.0 mg/dL    Nitrite Urine Negative Negative    Leukocyte Esterase Urine Negative Negative    Mucus Urine Present (A) None Seen /LPF    RBC Urine 1 <=2 /HPF    WBC Urine <1 <=5 /HPF    Squamous Epithelials Urine 1 <=1 /HPF    Narrative    Urine Culture not indicated   [facility]     Status: None    Narrative    The following orders were created for panel order [facility].  Procedure                               Abnormality         Status                     ---------                               -----------         ------                     Extra Purple Top Tube[[id number]]                            Final result                 Please view results for these tests on the individual orders.   Extra Purple Top Tube     Status: None   Result Value Ref Range    Hold Specimen JIC    Glucose by meter     Status: Abnormal   Result Value Ref Range    GLUCOSE BY METER POCT 59 (L) 70 - 99 mg/dL   Glucose by meter     Status: Abnormal   Result Value Ref Range    GLUCOSE BY METER POCT 61 (L) 70 - 99 mg/dL   Glucose by meter     Status: Normal   Result Value Ref Range    GLUCOSE BY METER POCT 77 70 - 99 mg/dL   Glucose by meter     Status: Normal   Result Value Ref Range    GLUCOSE BY METER POCT 98 70 - 99 mg/dL   Glucose by meter     Status: Abnormal   Result Value Ref Range    GLUCOSE BY METER POCT 117 (H) 70 - 99 mg/dL   Glucose by meter     Status: Abnormal   Result Value Ref Range    GLUCOSE BY METER POCT 113 (H) 70 - 99 mg/dL       Assessments & Plan (with Medical Decision Making)   Assessment Summary and clinical Impresson: 48-year-old female presented by car with concern for low blood glucose readings over the last few weeks since her  .  Patient is on insulin with NPH and regular insulin twice a day.  She is also on metformin 1g twice daily.  On intake patient was afebrile.   Blood pressure was 182/84.  She was 98% on room air.  Patient reported prior to arrival her blood glucose was 59.  Recheck at the bedside was 62.  No change to her insulin regimen or metformin.  Patient is admitted since her   suddenly 2 months ago from metastatic neuroendocrine cancer her appetites been poor.  She has been going to grief counseling and seeking support.  She had no infectious symptoms specifically no cough no rash no fever no urinary symptoms.  With poor appetite and early satiety, hypoglycemia although subacute in the last 2 weeks workup was broadened to ensure there was no infectious cause for her hypoglycemia.  Her blood glucose seem to remain normal after discontinuation of dextrose.  She had mild hypokalemia which was repleted after.  After a period of care my suspicion that she is misusing her insulin as low.  She expressed comfort reach out to her treating endocrinologist for further definitive care including insulin dose adjustment.  I suggested reducing her midday insulin dose given that her readings are mostly lower during the midday.  Low threshold to return for reassessment and care.    ED course and plan:  Reviewed the medical record reviewed. Reviewed nurse triage note prior to arrival. Office visit on 24. A1c on 23.  Blood glucose was monitored serially during her visit. Blood work revealed episodes of hypoglycemia with glucose of 57 she was given a D10 infusion and seemed to have improvement in her blood glucose readings.  After D10 infusion was stopped recheck blood glucose revealed.   Urinalysis was negative for infection.  Normal CRP.  She had mild hypokalemia (K+-3.1) which was repleted orally.  No anion gap.    I considered a c-peptide-but elected not to check this as my suspicion for exogenous or excessive insulin therapy was low.  C-peptide is a send out with lab results within 24 hours and discussion with team.  After patient's dextrose infusion was held  for about an hour recheck blood glucose was 113.  She expressed comfort going home.  We discussed that it was prudent to connect with her treating endocrinologist for insulin dosing adjustment but I suggest that she cut her midday insulin dose in half given that her blood glucose has been lower during the midday.       Disclaimer: This note consists of symbols derived from keyboarding, dictation and/or voice recognition software. As a result, there may be errors in the script that have gone undetected. Please consider this when interpreting information found in this chart.   I have reviewed the nursing notes.    I have reviewed the findings, diagnosis, plan and need for follow up with the patient.           Medical Decision Making  The patient's presentation was of high complexity (subacute hypoglycemia, history of insulin-dependent diabetes).    The patient's evaluation involved:  ordering and/or review of 1 test(s) in this encounter (diagnostic labs)    The patient's management necessitated moderate risk (prescription drug management including medications given in the ED).        New Prescriptions    No medications on file       Final diagnoses:   Hypoglycemia   H/O insulin dependent diabetes mellitus   Hypokalemia       3/7/2024   [facility] EMERGENCY DEPT       Kael Yañez MD  03/07/24 0829

## 2024-03-09 ENCOUNTER — NURSE TRIAGE (OUTPATIENT)
Dept: NURSING | Facility: CLINIC | Age: 49
End: 2024-03-09
Payer: COMMERCIAL

## 2024-03-09 NOTE — TELEPHONE ENCOUNTER
Nurse Triage SBAR    Is this a 2nd Level Triage? NO    Situation: Hypoglycemia    Background: :Patient was treated in ED on 3/7/24 for hypoglycemia and had insulin decreased.    Assessment: Patient calling to report low blood sugars today. She reports that she had peanut butter trail mix at 1600, blood glucose at 1653 was 61 and is currently 70. She reports feeling lightheadedness. Patient's son is home with her. She denies confusion or severe weakness.    Protocol Recommended Disposition:   According to the protocol, patient should continue home care.  Care advice given.     LOW BLOOD SUGAR - TREATMENT - EAT SOME (15-20 GRAMS) SUGAR NOW: * Each of the following has the right amount of sugar. * GLUCOSE TABLETS (3 to 4 tablets); preferred, if available * Glucose gel (15 to 20 grams) * Fruit juice or non-diet soda (1/2 cup; 120 ml) * Milk (1 cup; 240 ml) * Pre-packaged juice box (1 box) * Skittles candy (15) * Table sugar or honey (3 teaspoons; 15 ml)     LOW BLOOD SUGAR - EXPECTED COURSE: * The low blood sugar symptoms should start getting better in 5 to 10 minutes. * The symptoms should resolve in about 15 to 20 minutes. * After the symptoms resolve, eat a small snack to prevent this from recurring. Examples include: cheese and crackers, a glass of milk, or half a sandwich. * If the low blood sugar symptoms are NOT BETTER IN 15 MINUTES, eat some more glucose (15 to 20 grams).     CALL BACK IF: * Symptoms do not improve within 30 minutes * Sleepiness or confusion occur * You become worse     Patient verbalizes understanding and agrees with plan of care.    Radha Pak RN  03/09/24 5:31 PM  [facility] Nurse Advisor    Reason for Disposition   [1] Blood glucose 70 mg/dl (3.9 mmol/l) or below, OR symptomatic AND [2] cause known    Additional Information   Negative: Unconscious or difficult to awaken   Negative: Seizure occurs   Negative: Acting confused (e.g., disoriented, slurred speech)   Negative: Very  weak (e.g., can't stand)   Negative: Sounds like a life-threatening emergency to the triager   Negative: [1] Vomiting AND [2] signs of dehydration (e.g., very dry mouth, lightheaded, dark urine)   Negative: [1] Low blood sugar symptoms persist > 30 minutes AND [2] using low blood sugar Care Advice   Negative: [1] Low blood glucose (70 mg/dl [3.9 mmol/l] or below) persists > 30 minutes AND [2] using low blood sugar Care Advice   Negative: Patient sounds very sick or weak to the triager   Negative: [1] Low blood sugar symptoms with no other adult present AND [2] hasn't tried Care Advice   Negative: [1] Low blood glucose (70 mg/dl [3.9 mmol/l] or below)) with no other adult present AND [2] hasn't tried Care Advice   Negative: Diabetes drug error or overdose (e.g., insulin error or extra dose)   Negative: [1] Caller has URGENT medication or insulin pump question AND [2] triager unable to answer question   Negative: [1] Blood glucose 70  mg/dL (3.9 mmol/L) or below OR symptomatic, now improved with Care Advice AND [2] cause unknown   Negative: [1] Caller has NON-URGENT medication or insulin pump question AND [2] triager unable to answer question   Negative: [1] Morning (before breakfast) blood glucose < 80 mg/dL (4.4 mmol/L) AND [2] more than once in past week   Negative: [1] Evening (after bedtime snack) blood glucose < 100 mg/dL (5.6 mmol/L) AND [2] more than once in past week    Protocols used: Diabetes - Low Blood Sugar-A-

## 2024-03-10 ENCOUNTER — NURSE TRIAGE (OUTPATIENT)
Dept: NURSING | Facility: CLINIC | Age: 49
End: 2024-03-10
Payer: COMMERCIAL

## 2024-03-10 NOTE — TELEPHONE ENCOUNTER
Triage  call  Patient calling she  has a low grade fever 99.6  Blood sugar 63 she took 3 peanut butter cup.  Asked her to recheck her blood sugar and it was 59.   She has glucose tablets asked her to take  4 tablets  and recheck in 1/2 hour and if not improve  she needs to go to ED. . She saw her endocrinologist and he changed her  nPH  in the morning 36 and 28 at night , her regular insulin  to 29 at breakfast 16 at lunch  and  30 at dinner.    Per protocol will call back in half hour to see if the blood sugar is improved called back and her blood sugar was up to 96 and she was feeling better.  She will check her blood sugar frequently and call back if it dris again and call her endocrinologist in the morning.    Tess Bell RN   [facility] Nurse Advisor  11:23 AM 3/10/2024    Reason for Disposition   [1] Low blood sugar symptoms with no other adult present AND [2] hasn't tried Care Advice    Additional Information   Negative: Unconscious or difficult to awaken   Negative: Seizure occurs   Negative: Acting confused (e.g., disoriented, slurred speech)   Negative: Very weak (e.g., can't stand)   Negative: Sounds like a life-threatening emergency to the triager   Negative: [1] Vomiting AND [2] signs of dehydration (e.g., very dry mouth, lightheaded, dark urine)   Negative: [1] Low blood sugar symptoms persist > 30 minutes AND [2] using low blood sugar Care Advice   Negative: Patient sounds very sick or weak to the triager   Negative: [1] Low blood glucose (70 mg/dl [3.9 mmol/l] or below) persists > 30 minutes AND [2] using low blood sugar Care Advice    Protocols used: Diabetes - Low Blood Sugar-A-

## 2024-03-12 ENCOUNTER — HOSPITAL ENCOUNTER (EMERGENCY)
Facility: CLINIC | Age: 49
Discharge: HOME | End: 2024-03-12
Attending: EMERGENCY MEDICINE | Admitting: EMERGENCY MEDICINE
Payer: COMMERCIAL

## 2024-03-12 ENCOUNTER — APPOINTMENT (OUTPATIENT)
Dept: GENERAL RADIOLOGY | Facility: CLINIC | Age: 49
End: 2024-03-12
Attending: EMERGENCY MEDICINE
Payer: COMMERCIAL

## 2024-03-12 VITALS
DIASTOLIC BLOOD PRESSURE: 77 MMHG | SYSTOLIC BLOOD PRESSURE: 153 MMHG | RESPIRATION RATE: 16 BRPM | TEMPERATURE: 98.5 F | OXYGEN SATURATION: 100 % | HEART RATE: 80 BPM

## 2024-03-12 DIAGNOSIS — S89.91XA KNEE INJURY, RIGHT, INITIAL ENCOUNTER: ICD-10-CM

## 2024-03-12 DIAGNOSIS — S89.92XA KNEE INJURY, LEFT, INITIAL ENCOUNTER: ICD-10-CM

## 2024-03-12 DIAGNOSIS — M25.469 TRAUMATIC EFFUSION OF KNEE JOINT: ICD-10-CM

## 2024-03-12 PROCEDURE — 73560 X-RAY EXAM OF KNEE 1 OR 2: CPT | Mod: 50

## 2024-03-12 PROCEDURE — 250N000013 HC RX MED GY IP 250 OP 250 PS 637: Performed by: EMERGENCY MEDICINE

## 2024-03-12 PROCEDURE — G0463 HOSPITAL OUTPT CLINIC VISIT: HCPCS

## 2024-03-12 PROCEDURE — 99213 OFFICE O/P EST LOW 20 MIN: CPT | Performed by: EMERGENCY MEDICINE

## 2024-03-12 RX ORDER — IBUPROFEN 200 MG
800 TABLET ORAL ONCE
Status: COMPLETED | OUTPATIENT
Start: 2024-03-12 | End: 2024-03-12

## 2024-03-12 RX ORDER — ACETAMINOPHEN 500 MG
1000 TABLET ORAL EVERY 8 HOURS PRN
COMMUNITY
Start: 2024-03-12 | End: 2024-03-17

## 2024-03-12 RX ORDER — IBUPROFEN 200 MG
800 TABLET ORAL EVERY 8 HOURS PRN
COMMUNITY
Start: 2024-03-12 | End: 2024-03-17

## 2024-03-12 RX ADMIN — IBUPROFEN 800 MG: 200 TABLET, FILM COATED ORAL at 19:56

## 2024-03-12 ASSESSMENT — ENCOUNTER SYMPTOMS
NUMBNESS: 0
SHORTNESS OF BREATH: 0
LIGHT-HEADEDNESS: 0
WOUND: 1
WEAKNESS: 0
NECK PAIN: 0
CHEST TIGHTNESS: 0
NAUSEA: 0
HEADACHES: 0
BACK PAIN: 0
APPETITE CHANGE: 0

## 2024-03-12 ASSESSMENT — COLUMBIA-SUICIDE SEVERITY RATING SCALE - C-SSRS
2. HAVE YOU ACTUALLY HAD ANY THOUGHTS OF KILLING YOURSELF IN THE PAST MONTH?: NO
6. HAVE YOU EVER DONE ANYTHING, STARTED TO DO ANYTHING, OR PREPARED TO DO ANYTHING TO END YOUR LIFE?: NO

## 2024-03-12 ASSESSMENT — ACTIVITIES OF DAILY LIVING (ADL): ADLS_ACUITY_SCORE: 35

## 2024-03-13 NOTE — ED PROVIDER NOTES
History   No chief complaint on file.    HPI  Emilie Kruse is a 48 year old female with no significant contributing past medical history presenting for evaluation of injury to her knees.  Patient was running to her car around tentatively this morning when she stumbled and fell forward onto her knees.  Denies striking her head or loss of consciousness.  Patient impacted her knees directly and had immediate pain.  She had to go about her day so was able to continue her work but has had achy soreness and stiffness throughout both knees ever since.  Due to the ongoing pain and swelling, she came in here tonight for further evaluation to make sure there was not anything more severely damaged.    Allergies:  Allergies   Allergen Reactions    Percocet [Oxycodone-Acetaminophen]        Problem List:    Patient Active Problem List    Diagnosis Date Noted    Mild or unspecified pre-eclampsia, antepartum 04/11/2021     Priority: Medium     Formatting of this note might be different from the original.  past hx of both pregnancies      Spinal stenosis, lumbar region, with neurogenic claudication 04/07/2021     Priority: Medium    Spondylolisthesis 04/07/2021     Priority: Medium    Atypical glandular cells on cervical Pap smear 04/21/2017     Priority: Medium     Formatting of this note might be different from the original.  Colposcopy, endometrial biosy - May 2017 - negative.  Pap smear, HPV testing in 12 months, 24 months.  If abnormal, repeat colposcopy.  Formatting of this note might be different from the original.  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  Diagnosed in 2009. On Metformin and lantus.  No microvascular complications       Radiology Post-Procedure Note    Pre Op Diagnosis: Ascites  Post Op Diagnosis: Same    Procedure: Ultrasound Guided Paracentesis    Procedure performed by: Souleymane ROSS, Елена     Written Informed Consent Obtained: Yes  Specimen Removed: YES clear yellow  Estimated Blood Loss: Minimal    Findings:   Successful paracentesis.  Albumin administered PRN per protocol.    Patient tolerated procedure well.    Елена Comer, APRN, FNP  Interventional Radiology  [phone] [facility]     Endometriosis of ovary 05/25/2012     Priority: Medium    Endometrioma 04/04/2012     Priority: Medium    Family history of ovarian cancer 04/04/2012     Priority: Medium    Essential hypertension 10/28/2010     Priority: Medium        Past Medical History:    No past medical history on file.    Past Surgical History:    No past surgical history on file.    Family History:    No family history on file.    Social History:  Marital Status:   [5]  Social History     Tobacco Use    Smoking status: Never   Substance Use Topics    Alcohol use: No    Drug use: No        Medications:    acetaminophen (TYLENOL) 500 MG tablet  ibuprofen (ADVIL/MOTRIN) 200 MG tablet  ASPIRIN NOT PRESCRIBED, INTENTIONAL,  insulin NPH (NOVOLIN N VIAL) 100 UNIT/ML vial  insulin regular 100 UNIT/ML vial  LISINOPRIL PO  metformin (GLUCOPHAGE) 1000 MG tablet          Review of Systems   Constitutional:  Negative for appetite change.   HENT:  Negative for congestion.    Respiratory:  Negative for chest tightness and shortness of breath.    Cardiovascular:  Negative for chest pain.   Gastrointestinal:  Negative for nausea.   Musculoskeletal:  Negative for back pain and neck pain.        Knee pain bilaterally   Skin:  Positive for wound (Abrasions to both knees).   Neurological:  Negative for weakness, light-headedness, numbness and headaches.   All other systems reviewed and are negative.      Physical Exam   BP: (!) 153/77  Pulse: 80  Temp: 98.5  F (36.9  C)  Resp: 16  SpO2: 100 %      Physical Exam  Vitals and nursing note reviewed.   Constitutional:       Appearance: Normal appearance. She is not ill-appearing or diaphoretic.   HENT:      Head: Atraumatic.   Cardiovascular:      Rate and Rhythm: Normal rate.      Pulses: Normal pulses.   Pulmonary:      Effort: Pulmonary effort is normal.   Musculoskeletal:      Right knee: Swelling and effusion present. No bony tenderness or crepitus. Decreased range of motion. Tenderness (Diffuse  tenderness) present.      Left knee: Swelling and effusion present. No bony tenderness or crepitus. Decreased range of motion. Tenderness (Diffuse tenderness) present.      Comments: Patient is able to fully extend both knees to near full extension.  Limited flexion due to effusion   Skin:     General: Skin is warm and dry.      Capillary Refill: Capillary refill takes less than 2 seconds.   Neurological:      Mental Status: She is alert and oriented to person, place, and time.   Psychiatric:         Mood and Affect: Mood normal.         ED Course        Procedures           Results for orders placed or performed during the hospital encounter of 03/12/24 (from the past 24 hour(s))   Knee XR, 2 views, bilateral    Narrative    EXAM: XR KNEE BILATERAL 1/2 VIEWS  LOCATION: [facility]  DATE: 3/12/2024    INDICATION: fall, injury to both knees  COMPARISON: None.      Impression    IMPRESSION:   RIGHT KNEE: Slight lateral tilt of the patella. Mild degenerative arthrosis of the patellofemoral compartment with mild joint space narrowing. No acute fracture or joint effusion.    LEFT KNEE: Normal joint spaces and alignment. No acute fracture. Small joint effusion.       Medications   ibuprofen (ADVIL/MOTRIN) tablet 800 mg (800 mg Oral $Given 3/12/24 1956)       Assessments & Plan (with Medical Decision Making)  48-year-old presented for evaluation of injury to her knees when she stumbled and fell on a parking lot earlier this morning.  Patient has been able to continue to ambulate and walk but has been having soreness and stiffness in her knees that is slowly worsened.  On exam she has bilateral abrasions to her knees with some palpable effusion and tenderness diffusely.  She also has some reduced range of motion due to the palpable effusion x-rays negative for acute fracture.  Counseled on symptomatic treatment and expected improvement over the next several days to weeks.     I have reviewed the  nursing notes.    I have reviewed the findings, diagnosis, plan and need for follow up with the patient.        Discharge Medication List as of 3/12/2024  8:22 PM        START taking these medications    Details   acetaminophen (TYLENOL) 500 MG tablet Take 2 tablets (1,000 mg) by mouth every 8 hours as needed for fever or pain, OTC      ibuprofen (ADVIL/MOTRIN) 200 MG tablet Take 4 tablets (800 mg) by mouth every 8 hours as needed for mild pain, OTC             Final diagnoses:   Knee injury, left, initial encounter   Knee injury, right, initial encounter   Traumatic effusion of knee joint       3/12/2024   [facility] EMERGENCY DEPT       Vance, Elian Romano MD  03/12/24 2043     sore throat

## 2024-04-20 ENCOUNTER — NURSE TRIAGE (OUTPATIENT)
Dept: NURSING | Facility: CLINIC | Age: 49
End: 2024-04-20
Payer: COMMERCIAL

## 2024-04-20 ENCOUNTER — HOSPITAL ENCOUNTER (EMERGENCY)
Facility: CLINIC | Age: 49
Discharge: HOME | End: 2024-04-20
Attending: EMERGENCY MEDICINE | Admitting: EMERGENCY MEDICINE
Payer: COMMERCIAL

## 2024-04-20 VITALS
HEIGHT: 63 IN | SYSTOLIC BLOOD PRESSURE: 161 MMHG | WEIGHT: 224.71 LBS | DIASTOLIC BLOOD PRESSURE: 73 MMHG | RESPIRATION RATE: 20 BRPM | OXYGEN SATURATION: 99 % | TEMPERATURE: 97.7 F | BODY MASS INDEX: 39.82 KG/M2 | HEART RATE: 81 BPM

## 2024-04-20 DIAGNOSIS — M25.552 HIP PAIN, LEFT: ICD-10-CM

## 2024-04-20 PROCEDURE — 99282 EMERGENCY DEPT VISIT SF MDM: CPT | Performed by: EMERGENCY MEDICINE

## 2024-04-20 RX ORDER — IBUPROFEN 600 MG/1
600 TABLET, FILM COATED ORAL EVERY 6 HOURS PRN
COMMUNITY

## 2024-04-20 RX ORDER — OXYCODONE HYDROCHLORIDE 5 MG/1
5 TABLET ORAL EVERY 4 HOURS PRN
COMMUNITY

## 2024-04-20 ASSESSMENT — ENCOUNTER SYMPTOMS
COUGH: 0
CHEST TIGHTNESS: 0
WEAKNESS: 0
ARTHRALGIAS: 1
SHORTNESS OF BREATH: 0
CHILLS: 0
FATIGUE: 0
HEADACHES: 0
NUMBNESS: 0
APPETITE CHANGE: 0
ABDOMINAL PAIN: 0
BACK PAIN: 0
FEVER: 0
JOINT SWELLING: 0
MYALGIAS: 0

## 2024-04-20 ASSESSMENT — COLUMBIA-SUICIDE SEVERITY RATING SCALE - C-SSRS
2. HAVE YOU ACTUALLY HAD ANY THOUGHTS OF KILLING YOURSELF IN THE PAST MONTH?: NO
1. IN THE PAST MONTH, HAVE YOU WISHED YOU WERE DEAD OR WISHED YOU COULD GO TO SLEEP AND NOT WAKE UP?: NO
6. HAVE YOU EVER DONE ANYTHING, STARTED TO DO ANYTHING, OR PREPARED TO DO ANYTHING TO END YOUR LIFE?: NO

## 2024-04-20 NOTE — ED PROVIDER NOTES
History     Chief Complaint   Patient presents with    Leg Pain     HPI  Emilie Kruse is a 48 year old female no significant contributing past medical history presenting for evaluation of left hip pain.  Patient woke with sharp intermittent pains in her hip.  Patient denies any known trauma.  Denies any lower extremity swelling or pain.  No pain in her calves or legs other than her hip.  No history of DVT or PE.  Denies any dysuria, urgency, or frequency.  Having some mild vaginal bleeding now and had a colposcopy done just a few days ago.  States she is recovering from that and has not needed medications for that in a few days.  Denies any back pain.  Denies any radiation or migration of the pain.  Patient has not taken any medications for the pain.  She reports she is able to walk but is sore with walking in her hip.  No history of arthritis.    Allergies:  Allergies   Allergen Reactions    Percocet [Oxycodone-Acetaminophen]        Problem List:    Patient Active Problem List    Diagnosis Date Noted    Mild or unspecified pre-eclampsia, antepartum 04/11/2021     Priority: Medium     Formatting of this note might be different from the original.  past hx of both pregnancies      Spinal stenosis, lumbar region, with neurogenic claudication 04/07/2021     Priority: Medium    Spondylolisthesis 04/07/2021     Priority: Medium    Atypical glandular cells on cervical Pap smear 04/21/2017     Priority: Medium     Formatting of this note might be different from the original.  Colposcopy, endometrial biosy - May 2017 - negative.  Pap smear, HPV testing in 12 months, 24 months.  If abnormal, repeat colposcopy.  Formatting of this note might be different from the original.  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  Diagnosed in 2009. On Metformin and lantus.  No microvascular complications      Endometriosis of ovary 05/25/2012     Priority: Medium    Endometrioma 04/04/2012     Priority: Medium    Family history of ovarian cancer 04/04/2012     Priority: Medium    Essential hypertension 10/28/2010     Priority: Medium        Past Medical History:    No past medical history on file.    Past Surgical History:    No past surgical history on file.    Family History:    No family history on file.    Social History:  Marital Status:   [5]  Social History     Tobacco Use    Smoking status: Never   Substance Use Topics    Alcohol use: No    Drug use: No        Medications:    ibuprofen (ADVIL/MOTRIN) 600 MG tablet  oxyCODONE (ROXICODONE) 5 MG tablet  ASPIRIN NOT PRESCRIBED, INTENTIONAL,  insulin NPH (NOVOLIN N VIAL) 100 UNIT/ML vial  insulin regular 100 UNIT/ML vial  LISINOPRIL PO  metformin (GLUCOPHAGE) 1000 MG tablet          Review of Systems   Constitutional:  Negative for appetite change, chills, fatigue and fever.   HENT:  Negative for congestion.    Respiratory:  Negative for cough, chest tightness and shortness of breath.    Cardiovascular:  Negative for chest pain.   Gastrointestinal:  Negative for abdominal pain.   Genitourinary:  Positive for vaginal bleeding (Slight).   Musculoskeletal:  Positive for arthralgias (Left hip pain). Negative for back pain, joint swelling and myalgias.   Skin:  Negative for rash.   Neurological:  Negative for weakness, numbness and headaches.   All other systems reviewed and are negative.      Physical Exam   BP: (!) 161/73  Pulse: 81  Temp: 97.7  F (36.5  C)  Resp: 20  Height: 160 cm (5' 3\")  Weight: 101.9 kg (224 lb 11.4 oz)  SpO2: 99 %      Physical Exam  Vitals and nursing note reviewed.   Constitutional:       Appearance: Normal appearance. She is obese. She is not ill-appearing or diaphoretic.      " Comments: Awake and alert no distress.  Able to speak in full sentences and provide full history.   HENT:      Head: Atraumatic.      Nose: Nose normal.   Eyes:      Conjunctiva/sclera: Conjunctivae normal.   Cardiovascular:      Pulses: Normal pulses.   Pulmonary:      Effort: Pulmonary effort is normal.   Musculoskeletal:      Left hip: Tenderness (Mild anterior tenderness) present. No deformity or bony tenderness. Normal range of motion. Normal strength.      Right lower leg: No edema.      Left lower leg: No edema.        Legs:       Comments: No calf pain or tenderness   Skin:     General: Skin is warm and dry.      Capillary Refill: Capillary refill takes less than 2 seconds.   Neurological:      Mental Status: She is alert and oriented to person, place, and time.   Psychiatric:         Mood and Affect: Mood normal.         ED Course        Procedures                  No results found for this or any previous visit (from the past 24 hour(s)).    Medications - No data to display    Assessments & Plan (with Medical Decision Making)  48-year-old presenting for evaluation of acute onset of left hip pain.  Patient woke with the symptoms this morning.  She reports sharp pain intermittently with movement in her hip.  No pain at rest in a neutral position.  Does have pain with walking.  No falls or trauma.  Pain is reproducible with palpation or hip flexion especially with some external rotation.  Does not appear to have any bony tenderness.  No lower extremity pain or swelling to suggest DVT.  Patient did recently have a colposcopy and part of her positioning may have previous posterior to this hip pain.  Recommended symptomatic treatment for now with anticipated spontaneous resolution in the next few days.  Advised patient if symptoms worsen or new symptoms develop, return to the ED for repeat evaluation.     I have reviewed the nursing notes.    I have reviewed the findings, diagnosis, plan and need for follow up  with the patient.          New Prescriptions    No medications on file       Final diagnoses:   Hip pain, left       4/20/2024   [facility] EMERGENCY DEPT       Vance, Elian Romano MD  04/20/24 0511

## 2024-04-20 NOTE — TELEPHONE ENCOUNTER
Patient calling. She woke about a half hour ago with severe upper, anterior, left thigh pain which has not resolved with exercise, massage, positioning or ice. The pain is causing her to limp. No swelling. She had a hysteroscopy on Wednesday. She's been taking ibuprofen for the pain related to the hysteroscopy.     Care advice given for patient to be seen in the emergency department at [facility]. She will have a family member drive her.     Sharon Pollard RN  [facility]  April 20, 2024, 4:44 AM    Reason for Disposition   Unable to walk    Additional Information   Negative: Looks like a broken bone or dislocated joint (e.g., crooked or deformed)   Negative: Sounds like a life-threatening emergency to the triager   Negative: Followed a leg injury   Negative: Leg swelling is main symptom   Negative: Back pain radiating (shooting) into leg(s)   Negative: Knee pain is main symptom   Negative: Ankle pain is main symptom   Negative: Pregnant   Negative: Postpartum (from 0 to 6 weeks after delivery)   Negative: Chest pain   Negative: Difficulty breathing   Negative: Entire foot is cool or blue in comparison to other side    Protocols used: Leg Pain-A-AH

## 2024-04-20 NOTE — ED TRIAGE NOTES
"Patient states she got pain in left groin while walking to the bathroom this AM which felt like a \"ramila horse.\" Patient states pain is worse when she bends her legs or walks. Patient concerned because she had a hystoscopy on Wednesday.      Triage Assessment (Adult)       Row Name 04/20/24 0524          Triage Assessment    Airway WDL WDL        Respiratory WDL    Respiratory WDL WDL        Skin Circulation/Temperature WDL    Skin Circulation/Temperature WDL WDL        Cardiac WDL    Cardiac WDL WDL        Peripheral/Neurovascular WDL    Peripheral Neurovascular WDL WDL        Cognitive/Neuro/Behavioral WDL    Cognitive/Neuro/Behavioral WDL WDL                     "

## 2024-04-20 NOTE — DISCHARGE INSTRUCTIONS
Your hip pain appears to be due to possible bursitis or ligamentous strain.  You do not have any other symptoms to suggest a blood clot this time.  I recommend conservative treatment at this time with ibuprofen, ice, rest.  If your symptoms worsen or any new or concerning symptoms develop, please follow-up with your primary doctor or return to the ED for repeat evaluation.

## 2024-06-15 ENCOUNTER — HEALTH MAINTENANCE LETTER (OUTPATIENT)
Age: 49
End: 2024-06-15

## 2024-10-20 ENCOUNTER — HOSPITAL ENCOUNTER (EMERGENCY)
Facility: CLINIC | Age: 49
Discharge: HOME | End: 2024-10-20
Payer: COMMERCIAL

## 2024-10-20 ENCOUNTER — NURSE TRIAGE (OUTPATIENT)
Dept: NURSING | Facility: CLINIC | Age: 49
End: 2024-10-20
Payer: COMMERCIAL

## 2024-10-20 ENCOUNTER — APPOINTMENT (OUTPATIENT)
Dept: ULTRASOUND IMAGING | Facility: CLINIC | Age: 49
End: 2024-10-20
Payer: COMMERCIAL

## 2024-10-20 VITALS
HEIGHT: 63 IN | OXYGEN SATURATION: 100 % | DIASTOLIC BLOOD PRESSURE: 66 MMHG | HEART RATE: 74 BPM | SYSTOLIC BLOOD PRESSURE: 146 MMHG | WEIGHT: 207 LBS | TEMPERATURE: 97.1 F | RESPIRATION RATE: 16 BRPM | BODY MASS INDEX: 36.68 KG/M2

## 2024-10-20 DIAGNOSIS — D62 ANEMIA DUE TO BLOOD LOSS, ACUTE: ICD-10-CM

## 2024-10-20 DIAGNOSIS — N93.9 VAGINAL BLEEDING: ICD-10-CM

## 2024-10-20 LAB
ABO/RH(D): NORMAL
ANION GAP SERPL CALCULATED.3IONS-SCNC: 12 MMOL/L (ref 7–15)
ANTIBODY SCREEN: NEGATIVE
BASOPHILS # BLD AUTO: 0 10E3/UL (ref 0–0.2)
BASOPHILS NFR BLD AUTO: 0 %
BUN SERPL-MCNC: 18 MG/DL (ref 6–20)
CALCIUM SERPL-MCNC: 9.4 MG/DL (ref 8.8–10.4)
CHLORIDE SERPL-SCNC: 104 MMOL/L (ref 98–107)
CREAT SERPL-MCNC: 0.82 MG/DL (ref 0.51–0.95)
EGFRCR SERPLBLD CKD-EPI 2021: 88 ML/MIN/1.73M2
EOSINOPHIL # BLD AUTO: 0.1 10E3/UL (ref 0–0.7)
EOSINOPHIL NFR BLD AUTO: 1 %
ERYTHROCYTE [DISTWIDTH] IN BLOOD BY AUTOMATED COUNT: 13 % (ref 10–15)
GLUCOSE SERPL-MCNC: 185 MG/DL (ref 70–99)
HCG SERPL QL: NEGATIVE
HCO3 SERPL-SCNC: 21 MMOL/L (ref 22–29)
HCT VFR BLD AUTO: 31.2 % (ref 35–47)
HGB BLD-MCNC: 10.7 G/DL (ref 11.7–15.7)
HOLD SPECIMEN: NORMAL
IMM GRANULOCYTES # BLD: 0 10E3/UL
IMM GRANULOCYTES NFR BLD: 0 %
LYMPHOCYTES # BLD AUTO: 1.7 10E3/UL (ref 0.8–5.3)
LYMPHOCYTES NFR BLD AUTO: 25 %
MCH RBC QN AUTO: 29.8 PG (ref 26.5–33)
MCHC RBC AUTO-ENTMCNC: 34.3 G/DL (ref 31.5–36.5)
MCV RBC AUTO: 87 FL (ref 78–100)
MONOCYTES # BLD AUTO: 0.5 10E3/UL (ref 0–1.3)
MONOCYTES NFR BLD AUTO: 8 %
NEUTROPHILS # BLD AUTO: 4.5 10E3/UL (ref 1.6–8.3)
NEUTROPHILS NFR BLD AUTO: 66 %
NRBC # BLD AUTO: 0 10E3/UL
NRBC BLD AUTO-RTO: 0 /100
PLATELET # BLD AUTO: 261 10E3/UL (ref 150–450)
POTASSIUM SERPL-SCNC: 4.1 MMOL/L (ref 3.4–5.3)
RBC # BLD AUTO: 3.59 10E6/UL (ref 3.8–5.2)
SODIUM SERPL-SCNC: 137 MMOL/L (ref 135–145)
SPECIMEN EXPIRATION DATE: NORMAL
WBC # BLD AUTO: 6.7 10E3/UL (ref 4–11)

## 2024-10-20 PROCEDURE — 86900 BLOOD TYPING SEROLOGIC ABO: CPT

## 2024-10-20 PROCEDURE — 76856 US EXAM PELVIC COMPLETE: CPT

## 2024-10-20 PROCEDURE — 84703 CHORIONIC GONADOTROPIN ASSAY: CPT

## 2024-10-20 PROCEDURE — 86850 RBC ANTIBODY SCREEN: CPT

## 2024-10-20 PROCEDURE — 36415 COLL VENOUS BLD VENIPUNCTURE: CPT

## 2024-10-20 PROCEDURE — 99284 EMERGENCY DEPT VISIT MOD MDM: CPT | Mod: 25

## 2024-10-20 PROCEDURE — 80048 BASIC METABOLIC PNL TOTAL CA: CPT

## 2024-10-20 PROCEDURE — 85025 COMPLETE CBC W/AUTO DIFF WBC: CPT

## 2024-10-20 ASSESSMENT — COLUMBIA-SUICIDE SEVERITY RATING SCALE - C-SSRS
1. IN THE PAST MONTH, HAVE YOU WISHED YOU WERE DEAD OR WISHED YOU COULD GO TO SLEEP AND NOT WAKE UP?: NO
6. HAVE YOU EVER DONE ANYTHING, STARTED TO DO ANYTHING, OR PREPARED TO DO ANYTHING TO END YOUR LIFE?: NO
2. HAVE YOU ACTUALLY HAD ANY THOUGHTS OF KILLING YOURSELF IN THE PAST MONTH?: NO

## 2024-10-20 ASSESSMENT — ACTIVITIES OF DAILY LIVING (ADL)
ADLS_ACUITY_SCORE: 35

## 2024-10-20 NOTE — TELEPHONE ENCOUNTER
Nurse Triage SBAR    Is this a 2nd Level Triage? NO    Situation: Menorrhagia    Background: Having period issues. Missed periods for several months. Last period was 09/8 but never fully stopped, she states it was light spotting until yesterday morning when it started to be a heavy flow. Has had bleeding issues in the past which she had had a previous US for. History of diabetes    Assessment: States she is changing her super absorbing tampon twice or more per hour since last night.     Protocol Recommended Disposition:   Go to ED Now (Or PCP Triage)    Recommendation: Go to ED now or PCP triage. Unable to offer PCP triage since patient is not established with [facility]. Patient advised to go to ED.       Reason for Disposition   SEVERE vaginal bleeding (e.g., soaking 2 pads or tampons per hour and present 2 or more hours; 1 menstrual cup every 2 hours)    Additional Information   Negative: Shock suspected (e.g., cold/pale/clammy skin, too weak to stand, low BP, rapid pulse)   Negative: Difficult to awaken or acting confused (e.g., disoriented, slurred speech)   Negative: Passed out (i.e., lost consciousness, collapsed and was not responding)   Negative: Sounds like a life-threatening emergency to the triager   Negative: SEVERE abdominal pain   Negative: SEVERE dizziness (e.g., unable to stand, requires support to walk, feels like passing out now)    Protocols used: Vaginal Bleeding - Pgkcftil-D-VC

## 2024-10-20 NOTE — ED PROVIDER NOTES
EMERGENCY DEPARTMENT ENCOUNTER      NAME: Emilie Kruse  AGE: 49 year old female  YOB: 1975  MRN: [id number]  EVALUATION DATE & TIME: No admission date for patient encounter.    PCP: Wegener, Brita L    ED PROVIDER: Rylan Hobson MD      Chief Complaint   Patient presents with    Vaginal Bleeding         FINAL IMPRESSION:  1. Vaginal bleeding    2. Anemia due to blood loss, acute          ED COURSE & MEDICAL DECISION MAKING:    Pertinent Labs & Imaging studies reviewed. (See chart for details)  49 year old female presents to the Emergency Department for evaluation of vaginal bleeding.  Differential diagnosis considered Ectopic pregnancy, miscarriage, dysfunctional uterine bleeding, fibroids, vaginal laceration, anemia, coagulopathy.     ED Course as of 10/22/24 1950   Sun Oct 20, 2024   1650 Patient presents with vaginal bleeding.  Last period was 8/9 however has not fully stopped and states it was light spotting until yesterday and then began to have a heavy flow.  She is soaking tampons twice per hour since yesterday.  She states is heavier than usual periods.  Denies syncopal episodes.  On exam she is mildly tachycardic.  She has no radiation of pain, pink conjunctiva.  She has mild suprapubic pain.  Will obtain labs and ultrasound further evaluation.  Will monitor bleeding.   1748 Hemoglobin 10.7, 3 years ago was 12.8   1748 HCG QUALitative pregnancy (blood)  Pregnancy negative, doubt ectopic pregnancy   1937 Spoke to Dr. Pollard does not require intervention given his stable vital signs today.  Recommends rapid access to OB/GYN she does not have established OB/GYN.   Reevaluated patient prior to discharge.  She has not changed her pad since being in the ED.  Doubt significant or life-threatening hemorrhage.  She did not become syncopal upon standing.  Vital signs remained stable.  Discussed ibuprofen use for pain control as well as bleeding control.  Will have her follow-up with OB/GYN.   Considered starting OCPs however given patient's history of hypertension as well as possible past DVTs requiring anticoagulation in the past I deferred OCPs due to contraindications.  She feels comfortable with this plan.    Not Applicable    I discussed the plan for discharge with the patient, and patient is agreeable. We discussed supportive cares at home and reasons for return to the ER including new or worsening symptoms - all questions and concerns addressed to the best of my ability. Strict return precautions discussed. Patient to be discharged by RN.    At the conclusion of the encounter I discussed the results of all of the tests and the disposition. The questions were answered. The patient or family acknowledged understanding and was agreeable with the care plan.     MEDICATIONS GIVEN IN THE EMERGENCY:  Medications - No data to display    NEW PRESCRIPTIONS STARTED AT TODAY'S ER VISIT  Discharge Medication List as of 10/20/2024  7:49 PM        Discharge Medication List as of 10/20/2024  7:49 PM          =================================================================    HPI  Use of : N/A         Emilie Kruse is a 49 year old female with a pertinent history of endometriosis and hypertension who comes for evaluation of vaginal bleeding.    The patient's last menstrual period was on September 8th but she has continued to have spotting since then. However, it got much worse last night when she woke up last night at 3:00 AM from abdominal cramping and found that she had soaked through a pad. Since then she has soaked through pads every hour. The abdominal cramping feels similar to menstrual cramps. She did take ibuprofen this morning. She endorses dizziness and lightheadedness but has been able to walk in to the ED. Her abdominal surgical history include 2 c-sections 21 and 23 years ago. No current birth control pills or blood thinners. She sees Brita Wegener, PA at the [facility]  "for her primary care.     She denies dysuria or any other complaints at this time.       PHYSICAL EXAM    BP (!) 146/66   Pulse 74   Temp 97.1  F (36.2  C) (Temporal)   Resp 16   Ht 1.6 m (5' 3\")   Wt 93.9 kg (207 lb)   LMP 09/08/2024   SpO2 100%   BMI 36.67 kg/m    Constitutional: Well developed, well nourished. Comfortable appearing.  Head: Normocephalic, atraumatic, mucous membranes moist, nose normal, pink conjunctiva.  Neck: Supple, gross ROM intact.   Eyes: Pupils mid-range, sclera white.  Respiratory: Clear to auscultation bilaterally, no respiratory distress, no wheezing, speaks full sentences easily.  Cardiovascular: Tachycardic, regular rhythm, no murmurs. No lower extremity edema.   GI: Soft, mild suprapubic pain with palpation.  Musculoskeletal: Moving all 4 extremities intentionally and without pain. No obvious deformity.  Skin: Warm, dry, no rash.  Neurologic: Alert & oriented x 3, speech clear, moving all extremities spontaneously   Psychiatric: Affect normal, cooperative.       LAB:  All pertinent labs reviewed and interpreted.  Results for orders placed or performed during the hospital encounter of 10/20/24   US Pelvic Complete with Transvaginal    Impression    IMPRESSION:    1.  Mildly thickened and heterogeneous endometrium, indeterminate. Although heterogeneity could reflect underlying blood products, attention on short-term pelvic ultrasound follow-up or gynecologic consultation recommended given heavy bleeding (to   exclude other abnormality).    2.  Remaining exam unremarkable.     Extra Blue Top Tube   Result Value Ref Range    Hold Specimen JIC    Extra Red Top Tube   Result Value Ref Range    Hold Specimen .    Extra Green Top (Lithium Heparin) Tube   Result Value Ref Range    Hold Specimen .    Extra Purple Top Tube   Result Value Ref Range    Hold Specimen .    Extra Blood Bank Purple Top Tube   Result Value Ref Range    Hold Specimen JI    Basic metabolic panel   Result Value " Ref Range    Sodium 137 135 - 145 mmol/L    Potassium 4.1 3.4 - 5.3 mmol/L    Chloride 104 98 - 107 mmol/L    Carbon Dioxide (CO2) 21 (L) 22 - 29 mmol/L    Anion Gap 12 7 - 15 mmol/L    Urea Nitrogen 18.0 6.0 - 20.0 mg/dL    Creatinine 0.82 0.51 - 0.95 mg/dL    GFR Estimate 88 >60 mL/min/1.73m2    Calcium 9.4 8.8 - 10.4 mg/dL    Glucose 185 (H) 70 - 99 mg/dL   HCG QUALitative pregnancy (blood)   Result Value Ref Range    hCG Serum Qualitative Negative Negative   CBC with platelets and differential   Result Value Ref Range    WBC Count 6.7 4.0 - 11.0 10e3/uL    RBC Count 3.59 (L) 3.80 - 5.20 10e6/uL    Hemoglobin 10.7 (L) 11.7 - 15.7 g/dL    Hematocrit 31.2 (L) 35.0 - 47.0 %    MCV 87 78 - 100 fL    MCH 29.8 26.5 - 33.0 pg    MCHC 34.3 31.5 - 36.5 g/dL    RDW 13.0 10.0 - 15.0 %    Platelet Count 261 150 - 450 10e3/uL    % Neutrophils 66 %    % Lymphocytes 25 %    % Monocytes 8 %    % Eosinophils 1 %    % Basophils 0 %    % Immature Granulocytes 0 %    NRBCs per 100 WBC 0 <1 /100    Absolute Neutrophils 4.5 1.6 - 8.3 10e3/uL    Absolute Lymphocytes 1.7 0.8 - 5.3 10e3/uL    Absolute Monocytes 0.5 0.0 - 1.3 10e3/uL    Absolute Eosinophils 0.1 0.0 - 0.7 10e3/uL    Absolute Basophils 0.0 0.0 - 0.2 10e3/uL    Absolute Immature Granulocytes 0.0 <=0.4 10e3/uL    Absolute NRBCs 0.0 10e3/uL   Adult Type and Screen   Result Value Ref Range    ABO/RH(D) O POS     Antibody Screen Negative Negative    SPECIMEN EXPIRATION DATE [id number]        RADIOLOGY:  Reviewed all pertinent imaging. Please see official radiology report.  US Pelvic Complete with Transvaginal   Final Result   IMPRESSION:      1.  Mildly thickened and heterogeneous endometrium, indeterminate. Although heterogeneity could reflect underlying blood products, attention on short-term pelvic ultrasound follow-up or gynecologic consultation recommended given heavy bleeding (to    exclude other abnormality).      2.  Remaining exam unremarkable.             EKG:     None    PROCEDURES:   None      Rylan Hobson MD  [facility] EMERGENCY ROOM  [address] [facility] [id number] [phone]   =================================================================    BILLING:  Data  Category 1  Non-ED record review, if applicable. External record reviewed: N/A     Clinical information was obtained from an independent historian. History was obtained from: Patient and Significant other provided additional information in the HPI     The following testing was considered but ultimately not selected after discussion with patient/family: N/A     Category 2  My independent interpretation of EKG, rhythm strip, radiology study: Ultrasound of ultrasound of the pelvis did not reveal intrauterine pregnancy     Category 3  Discussion of management with other physician/healthcare provider/other source: Spoke to OBGYN regarding patient's ED course and agrees to consult       Risk  Prescription medication was considered, but ultimately not given after discussion with patient/family: N/A     Chronic conditions affecting care: N/A     Care significantly affected by Social Determinants of Health: N/A     Consideration of Admission/Observation: Escalation of care including admission/observation was considered given the complexity and risk of the patient's presenting complaint, exam findings, and/or their underlying comorbidities. However, ultimately I feel the patient is safe for outpatient management with close follow up. Reasoning: Work-up reassuring, does not reveal any acute life/organ threatening processes, patient's symptoms well controlled upon reevaluation, reexamination is reassuring, vitals are stable, patient agreeable with discharge, reliable for follow-up.   Considered admission for observation however we have prompt follow-up and patient is otherwise hemodynamically stable and believe she can follow-up in the outpatient setting.  She agreeable  with this plan.              Rylan Hobson MD  10/22/24 1952

## 2024-10-20 NOTE — ED TRIAGE NOTES
Pt presents to the ED with c/o of vaginal bleeding that started as a regular period 8th that continued until yesterday when she noted that she started bleeding heavy.  Pt now bleeding through a pad and tampon an hour. Pt not having any sob. Reports some dizziness today.      Triage Assessment (Adult)       Row Name 10/20/24 3637          Triage Assessment    Airway WDL WDL        Respiratory WDL    Respiratory WDL WDL        Skin Circulation/Temperature WDL    Skin Circulation/Temperature WDL WDL        Cardiac WDL    Cardiac WDL WDL        Peripheral/Neurovascular WDL    Peripheral Neurovascular WDL WDL        Cognitive/Neuro/Behavioral WDL    Cognitive/Neuro/Behavioral WDL WDL

## 2024-10-21 NOTE — DISCHARGE INSTRUCTIONS
You have been evaluated in the Emergency Department today for vaginal bleeding. Your evaluation suggests that your symptoms are due to possible heavy menstrual periods.  You have been given a copy of your labs and ultrasound results.  Please take them with you to future appointments regarding vaginal bleeding.    Please take ibuprofen for pain control.    Please follow up with your primary care physician as instructed.    Please follow up with your OB/Gyn within 2 days.    Return to the Emergency Department if you experience worsening or uncontrolled bleeding, shortness of breath, feeling lightheaded, chest tightness, abdominal cramping, severe abdominal pain, fevers, vomiting, or for any other concerning symptoms.    Thank you for choosing us for your care.

## 2024-10-21 NOTE — ED NOTES
Discharge instructions discussed with patient and significant other and all questions answered. Pt agreeable with discharge plan of care. VSS. Pt ambulatory at discharge without any difficulty.

## 2024-10-22 LAB
ABO/RH(D): NORMAL
ANTIBODY SCREEN: NEGATIVE
SPECIMEN EXPIRATION DATE: NORMAL

## 2024-10-23 ENCOUNTER — TELEPHONE (OUTPATIENT)
Dept: OBGYN | Facility: CLINIC | Age: 49
End: 2024-10-23

## 2024-10-23 ENCOUNTER — OFFICE VISIT (OUTPATIENT)
Dept: OBGYN | Facility: CLINIC | Age: 49
End: 2024-10-23
Payer: COMMERCIAL

## 2024-10-23 ENCOUNTER — PREP FOR PROCEDURE (OUTPATIENT)
Dept: OBGYN | Facility: CLINIC | Age: 49
End: 2024-10-23

## 2024-10-23 VITALS
DIASTOLIC BLOOD PRESSURE: 71 MMHG | WEIGHT: 225 LBS | HEIGHT: 63 IN | HEART RATE: 92 BPM | BODY MASS INDEX: 39.87 KG/M2 | TEMPERATURE: 97.1 F | SYSTOLIC BLOOD PRESSURE: 114 MMHG | RESPIRATION RATE: 18 BRPM

## 2024-10-23 DIAGNOSIS — N93.9 VAGINAL BLEEDING: Primary | ICD-10-CM

## 2024-10-23 DIAGNOSIS — N93.9 ABNORMAL UTERINE BLEEDING (AUB): Primary | ICD-10-CM

## 2024-10-23 DIAGNOSIS — D62 ANEMIA DUE TO BLOOD LOSS, ACUTE: ICD-10-CM

## 2024-10-23 LAB
ALBUMIN SERPL BCG-MCNC: 3.9 G/DL (ref 3.5–5.2)
ALP SERPL-CCNC: 81 U/L (ref 40–150)
ALT SERPL W P-5'-P-CCNC: 25 U/L (ref 0–50)
ANION GAP SERPL CALCULATED.3IONS-SCNC: 8 MMOL/L (ref 7–15)
AST SERPL W P-5'-P-CCNC: 27 U/L (ref 0–45)
BILIRUB SERPL-MCNC: 0.7 MG/DL
BUN SERPL-MCNC: 14 MG/DL (ref 6–20)
CALCIUM SERPL-MCNC: 8.8 MG/DL (ref 8.8–10.4)
CHLORIDE SERPL-SCNC: 101 MMOL/L (ref 98–107)
CREAT SERPL-MCNC: 0.76 MG/DL (ref 0.51–0.95)
EGFRCR SERPLBLD CKD-EPI 2021: >90 ML/MIN/1.73M2
ERYTHROCYTE [DISTWIDTH] IN BLOOD BY AUTOMATED COUNT: 13.1 % (ref 10–15)
FERRITIN SERPL-MCNC: 14 NG/ML (ref 6–175)
GLUCOSE SERPL-MCNC: 171 MG/DL (ref 70–99)
HCO3 SERPL-SCNC: 24 MMOL/L (ref 22–29)
HCT VFR BLD AUTO: 26.9 % (ref 35–47)
HGB BLD-MCNC: 8.8 G/DL (ref 11.7–15.7)
IRON BINDING CAPACITY (ROCHE): 329 UG/DL (ref 240–430)
IRON SATN MFR SERPL: 11 % (ref 15–46)
IRON SERPL-MCNC: 36 UG/DL (ref 37–145)
MCH RBC QN AUTO: 29.7 PG (ref 26.5–33)
MCHC RBC AUTO-ENTMCNC: 32.7 G/DL (ref 31.5–36.5)
MCV RBC AUTO: 91 FL (ref 78–100)
PLATELET # BLD AUTO: 234 10E3/UL (ref 150–450)
POTASSIUM SERPL-SCNC: 4 MMOL/L (ref 3.4–5.3)
PROT SERPL-MCNC: 6.1 G/DL (ref 6.4–8.3)
RBC # BLD AUTO: 2.96 10E6/UL (ref 3.8–5.2)
SODIUM SERPL-SCNC: 133 MMOL/L (ref 135–145)
TSH SERPL DL<=0.005 MIU/L-ACNC: 2.43 UIU/ML (ref 0.3–4.2)
WBC # BLD AUTO: 6 10E3/UL (ref 4–11)

## 2024-10-23 PROCEDURE — 85027 COMPLETE CBC AUTOMATED: CPT | Performed by: STUDENT IN AN ORGANIZED HEALTH CARE EDUCATION/TRAINING PROGRAM

## 2024-10-23 PROCEDURE — 83550 IRON BINDING TEST: CPT | Performed by: STUDENT IN AN ORGANIZED HEALTH CARE EDUCATION/TRAINING PROGRAM

## 2024-10-23 PROCEDURE — 86850 RBC ANTIBODY SCREEN: CPT | Performed by: STUDENT IN AN ORGANIZED HEALTH CARE EDUCATION/TRAINING PROGRAM

## 2024-10-23 PROCEDURE — 83540 ASSAY OF IRON: CPT | Performed by: STUDENT IN AN ORGANIZED HEALTH CARE EDUCATION/TRAINING PROGRAM

## 2024-10-23 PROCEDURE — 82728 ASSAY OF FERRITIN: CPT | Performed by: STUDENT IN AN ORGANIZED HEALTH CARE EDUCATION/TRAINING PROGRAM

## 2024-10-23 PROCEDURE — 99204 OFFICE O/P NEW MOD 45 MIN: CPT | Performed by: STUDENT IN AN ORGANIZED HEALTH CARE EDUCATION/TRAINING PROGRAM

## 2024-10-23 PROCEDURE — 36415 COLL VENOUS BLD VENIPUNCTURE: CPT | Performed by: STUDENT IN AN ORGANIZED HEALTH CARE EDUCATION/TRAINING PROGRAM

## 2024-10-23 PROCEDURE — 86901 BLOOD TYPING SEROLOGIC RH(D): CPT | Performed by: STUDENT IN AN ORGANIZED HEALTH CARE EDUCATION/TRAINING PROGRAM

## 2024-10-23 PROCEDURE — 80053 COMPREHEN METABOLIC PANEL: CPT | Performed by: STUDENT IN AN ORGANIZED HEALTH CARE EDUCATION/TRAINING PROGRAM

## 2024-10-23 PROCEDURE — 86900 BLOOD TYPING SEROLOGIC ABO: CPT | Performed by: STUDENT IN AN ORGANIZED HEALTH CARE EDUCATION/TRAINING PROGRAM

## 2024-10-23 PROCEDURE — 84443 ASSAY THYROID STIM HORMONE: CPT | Performed by: STUDENT IN AN ORGANIZED HEALTH CARE EDUCATION/TRAINING PROGRAM

## 2024-10-23 RX ORDER — METRONIDAZOLE 500 MG/100ML
500 INJECTION, SOLUTION INTRAVENOUS
OUTPATIENT
Start: 2024-10-23

## 2024-10-23 RX ORDER — CEFAZOLIN 2 G/1
2 INJECTION, POWDER, FOR SOLUTION INTRAVENOUS SEE ADMIN INSTRUCTIONS
OUTPATIENT
Start: 2024-10-23

## 2024-10-23 RX ORDER — PHENAZOPYRIDINE HYDROCHLORIDE 100 MG/1
200 TABLET, FILM COATED ORAL ONCE
OUTPATIENT
Start: 2024-10-23 | End: 2024-10-23

## 2024-10-23 RX ORDER — MEDROXYPROGESTERONE ACETATE 10 MG
10 TABLET ORAL DAILY
Qty: 60 TABLET | Refills: 1 | Status: SHIPPED | OUTPATIENT
Start: 2024-10-23

## 2024-10-23 RX ORDER — TRANEXAMIC ACID 650 MG/1
1300 TABLET ORAL 3 TIMES DAILY
Qty: 30 TABLET | Refills: 3 | Status: SHIPPED | OUTPATIENT
Start: 2024-10-23

## 2024-10-23 RX ORDER — ACETAMINOPHEN 325 MG/1
975 TABLET ORAL ONCE
OUTPATIENT
Start: 2024-10-23 | End: 2024-10-23

## 2024-10-23 RX ORDER — CEFAZOLIN 2 G/1
2 INJECTION, POWDER, FOR SOLUTION INTRAVENOUS
OUTPATIENT
Start: 2024-10-23

## 2024-10-23 NOTE — NURSING NOTE
"Initial /71 (BP Location: Right arm, Patient Position: Chair, Cuff Size: Adult Regular)   Pulse 92   Temp 97.1  F (36.2  C) (Tympanic)   Resp 18   Ht 1.6 m (5' 3\")   Wt 102.1 kg (225 lb)   LMP 09/08/2024   BMI 39.86 kg/m   Estimated body mass index is 39.86 kg/m  as calculated from the following:    Height as of this encounter: 1.6 m (5' 3\").    Weight as of this encounter: 102.1 kg (225 lb). .    "

## 2024-10-23 NOTE — TELEPHONE ENCOUNTER
"[id number]  Emilie Kruse    You are now scheduled for surgery at [facility].  Below are the details for your surgery.  Please read the \"Preparing for Your Surgery\" instructions and let us know if you have any questions.    Type of surgery: HYSTERECTOMY, ROBOT-ASSISTED, LAPAROSCOPIC, USING DA AIMEE XI; bilateral salpingo-oophorecotmy, possible cystoscopy (Bilateral)   Surgeon:  Jelena Bowles MD  Location of surgery: [facility] OR    Date of surgery: 12/17/24    Time: 10:15am   Arrival Time: 8:45am    Time can change, to be confirmed a couple of days prior by pre-op surgery nurse.    Pre-Op Appt Date: Patient to schedule with a PCP or Family Practice Provider within 30 days to the surgery.  Post-Op Appt Date: 12/31/24   Time: 2:00pm at [place] with Dr. Bowles    Packet sent out: Yes  Pre-cert/Authorization completed:  TBD by Financial Securing Office.   MA Sterilization/Hysterectomy Acknowledgment Consent signed: Not Applicable    [facility]  [phone]    Fax: [phone]  Same Day Surgery [phone]  Fax: [phone]  [facility] [phone]    "

## 2024-10-23 NOTE — PROGRESS NOTES
[facility]  Gynecology Office Note    Assessment and Plan:   Emilie Kruse, 49 year old , presents for abnormal uterine bleeding - irregular menses with ongoing spotting.     CBC obtained today, which showed worsening iron deficiency anemia. IV iron infusion ordered. TSH obtained, which returned normal.     With her pelvic ultrasound showing thick endometrial lining again, I recommended hysterectomy. With patient's strong family history of ovarian cancer, we also plan to remove bilateral ovaries too - total laparoscopic hysterectomy with Da Caroline, bilateral salpingo-oophorectomy, possible cystoscopy. Discussed surgical risks and postop recovery expectations.    Plan for provera 20mg daily for 1 week, then daily till surgery + TXA 1.3mg TID for 5 days starting now.     medroxyPROGESTERone (PROVERA) 10 MG tablet          Take 1 tablet (10 mg) by mouth daily. Take 20mg (2 tablets) daily for 1 week. Then 10mg daily till surgery, Disp-60 tablet, R-1, E-Prescribe Take 20mg daily for 1 week. Then 10mg daily till surgery        tranexamic acid (LYSTEDA) 650 MG tablet         Take 2 tablets (1,300 mg) by mouth 3 times daily., Disp-30 tablet, R-3, E-Prescribe              Orders Placed This Encounter   Procedures    TSH with free T4 reflex    CBC with platelets    Ferritin    Iron and iron binding capacity    Comprehensive metabolic panel (BMP + Alb, Alk Phos, ALT, AST, Total. Bili, TP)    Adult Type and Screen    ABO/Rh type and screen     Jelena Bowles MD  Obstetrics and Gynecology  [facility]   10/23/2024     -----------------------------------------------------------------------------------------------------------------------------------  HPI: Emilie Kruse, 49 year old , presents for abnormal uterine bleeding. She did not have a period between 3/2024-2024. She started having daily spotting on 2024. The spotting turned into heavy bleeding on 10/19. She  "is still bleeding.     Of note, patient had \"Hysteroscopy,  D&C with Truclear device\" with Dr. Karla Hanks on 2024 and surgical pathology returned with possible endometrial polyp.    ROS: A 10 pt ROS was completed and found to be otherwise negative unless mentioned in the HPI.     OBHx:   OB History    Para Term  AB Living   2 2 2 0 0 2   SAB IAB Ectopic Multiple Live Births   0 0 0 0 2      # Outcome Date GA Lbr Alonzo/2nd Weight Sex Type Anes PTL Lv   2 Term 03    M CS-LTranv   CARMENZA      Birth Comments: preeclampia   1 Term 01    F CS-LTranv   CARMENZA      Birth Comments: preeclampia     GYN Hx:   Patient's last menstrual period was 2024.  Menarche: 12 years old  Cycle: irregular menses (had a period in 2023, 2023, 2024, 3/2024)  Duration: normal period would last 5 days with heavy bleeding on day 2-3. Denies spotting in between periods prior to this whole abnormal uterine bleeding episode.  Dysmenorrhea: some cramping occasionally. Denies pelvic pain with defecation or urination during menses.  Contraception: s/p tubal ligation  Last Pap Smear:2024 w/ neg HPV  Abnormal Pap Smears: denied  Sexual Activity: currently sexually active. Denies any dyspareunia or vaginal dryness.  Denies any hx of STI or PID.     Endorsed history of ovarian cysts. Denies any hx of uterine fibroids or polyps. Denies any vaginal discharge, vulvar itching/burning or pain.  Denies breast mass, breast lesion/rash, breast pain, or nipple discharge. Denies any facial/back/abdominal hair growth or facial/body acne. Denied visual field defects.     Throbbing headache with position change that's new.    Past Medical History:   Diagnosis Date    Diabetes mellitus (H)     HTN (hypertension)      Patient Active Problem List    Diagnosis Date Noted    Anemia due to blood loss, acute 10/24/2024     Priority: Medium    Mild or unspecified pre-eclampsia, antepartum 2021     Priority: Medium     " Formatting of this note might be different from the original.  past hx of both pregnancies      Spinal stenosis, lumbar region, with neurogenic claudication 2021     Priority: Medium    Spondylolisthesis 2021     Priority: Medium    Atypical glandular cells on cervical Pap smear 2017     Priority: Medium     Formatting of this note might be different from the original.  Colposcopy, endometrial biosy - May 2017 - negative.  Pap smear, HPV testing in 12 months, 24 months.  If abnormal, repeat colposcopy.  Formatting of this note might be different from the original.  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  Diagnosed in . On Metformin and lantus.  No microvascular complications      Endometriosis of ovary 2012     Priority: Medium    Endometrioma 2012     Priority: Medium    Family history of ovarian cancer 2012     Priority: Medium    Essential hypertension 10/28/2010     Priority: Medium     Past Surgical History:   Procedure Laterality Date    CARPAL TUNNEL RELEASE RT/LT       SECTION      x2    HYSTEROSCOPY      LAPAROSCOPIC CHOLECYSTECTOMY      spine fushion      XR FOOT SURGERY JOSE LEFT     Medications:   Current Outpatient Medications   Medication Sig Dispense Refill    ASPIRIN NOT PRESCRIBED, INTENTIONAL, 81 each continuous prn. Antiplatelet medication not prescribed intentionally due to       ibuprofen (ADVIL/MOTRIN) 600 MG tablet Take 600 mg by mouth every 6 hours as needed for moderate pain      insulin NPH (NOVOLIN N VIAL) 100 UNIT/ML vial Inject 36 Units Subcutaneous 2 times daily      insulin regular 100 UNIT/ML vial Inject 30 Units Subcutaneous 3 times daily (before meals)  "30 units-breakfast  20 units-lunch  32 units-dinner      LISINOPRIL PO Take  by mouth.      metformin (GLUCOPHAGE) 1000 MG tablet Take 1,000 mg by mouth 2 times daily (with meals).      oxyCODONE (ROXICODONE) 5 MG tablet Take 5 mg by mouth every 4 hours as needed for severe pain     No Known Allergies  Social History: denied smoking, alcohol, or recreational drug use.   Family History: Mother passed away from ovarian cancer; sister had lung cancer; denied FH of breast cancer, colon cancer, or anesthesia problem  Family History   Problem Relation Age of Onset    Ovarian Cancer Mother      Physical Exam:   Vitals:    10/23/24 0822   BP: 114/71   BP Location: Right arm   Patient Position: Chair   Cuff Size: Adult Regular   Pulse: 92   Resp: 18   Temp: 97.1  F (36.2  C)   TempSrc: Tympanic   Weight: 102.1 kg (225 lb)   Height: 1.6 m (5' 3\")      Estimated body mass index is 39.86 kg/m  as calculated from the following:    Height as of this encounter: 1.6 m (5' 3\").    Weight as of this encounter: 102.1 kg (225 lb).    General appearance: well-hydrated, A&O x 3, no apparent distress  Lungs: Equal expansion bilaterally, no accessory muscle use  Heart: No heaves or thrills.   Constitutional: See vitals  Abdomen: Soft, non-tender, non-distended. No rebound, rigidity, or guarding.  Extremities: trace edema  Neuro: CN II-XII grossly intact  Genitourinary:  External genitalia: no erythema, no lesions.   Urethral meatus appropriate location without lesions or prolapse  Urethra: No masses, tenderness, or scarring  Bladder no fullness, masses, or tenderness.  Anus and Perineum: Unremarkable, no visible lesions  Vagina: Normal, healthy pink mucosa without any lesions. Physiologic vaginal discharge.   Cervix: normal appearance, no cervical motion tenderness.   Uterus: normal size, shape and consistency.   Adnexa: no masses or tenderness bilaterally.    Labs/Imaging:   Narrative & Impression   EXAM: US PELVIC " TRANSABDOMINAL AND TRANSVAGINAL  LOCATION: [facility]  DATE: 10/20/2024     INDICATION: Crampy pelvic pain and heavy vaginal bleeding.  COMPARISON: 6/12/2024. 3/12/2024.  TECHNIQUE: Transabdominal scans were performed. Endovaginal ultrasound was performed to better visualize the adnexa.     FINDINGS:     UTERUS: 11 x 6.5 x 5.3 cm. Normal in size and position with no masses. Nabothian cysts noted, incidental.     ENDOMETRIUM: 21 mm (previously 12 mm). Heterogeneous appearing.     RIGHT OVARY: 3.6 x 1.8 x 1.6 cm. Normal.     LEFT OVARY: 2.9 x 2.4 x 2.3 cm. Normal.     No significant free fluid.                                                                      IMPRESSION:     1.  Mildly thickened and heterogeneous endometrium, indeterminate. Although heterogeneity could reflect underlying blood products, attention on short-term pelvic ultrasound follow-up or gynecologic consultation recommended given heavy bleeding (to   exclude other abnormality).     2.  Remaining exam unremarkable.      10/20/2024:  -CBC: hgb 10.7, plt 261  -BMP: Cr 0.82  8/8/2024:  -hgbA1C: 7.8%    Component6 mo agoCase Report  Pathology Report                                  Case: [id number]                                  Authorizing Provider:  Karla Hanks MD  Collected:           04/17/2024 0856              Ordering Location:     [facility]   Received:            04/17/2024 0933              Pathologist:           Hattie Power MD                                                    Specimen:    Endometrial Curettings                                                              Final Diagnosis  A) ENDOMETRIUM, CURETTAGE:  1. Disordered proliferative endometrium with breakdown and some fragments suggestive of polyp(s)  2. Negative for chronic endometritis  3. Negative for atypia and malignancyElectronically signed by Hattie Power MD on 4/18/2024 at 12:45 PMComment  A) The  "endometrium exhibits a proliferative pattern with focal glandular budding and cystic change, as well as stromal breakdown. These findings are consistent with unopposed (prolonged) estrogen effect and are often associated with anovulatory cycling in patients of reproductive age. The changes in this sample are less diffuse than those seen in classic endometrial hyperplasia.Clinical Information  Endometrial thickening.  On hysteroscopy polypoid appearance, but no discrete polyp seen.Gross Description  A) Received in formalin, labeled with the patient's name and \"endometrial curettings,\" is a 3.0 x 2.0 x 0.3 cm aggregate of pink-tan mucosa admixed with clotted blood and mucous. The specimen is entirely submitted in 1 cassette.    [facility] [place]/17/[place]24Microscopic Description  The final diagnosis is based on microscopic examination of appropriate sections of all specimens.Additional Information  Interpreted at [facility], Central Laboratory - [address], [place], [place] [address] [facility]       Component      Latest Ref Rng 10/23/2024  9:30 AM   Sodium      135 - 145 mmol/L 133 (L)    Potassium      3.4 - 5.3 mmol/L 4.0    Carbon Dioxide (CO2)      22 - 29 mmol/L 24    Anion Gap      7 - 15 mmol/L 8    Urea Nitrogen      6.0 - 20.0 mg/dL 14.0    Creatinine      0.51 - 0.95 mg/dL 0.76    GFR Estimate      >60 mL/min/1.73m2 >90    Calcium      8.8 - 10.4 mg/dL 8.8    Chloride      98 - 107 mmol/L 101    Glucose      70 - 99 mg/dL 171 (H)    Alkaline Phosphatase      40 - 150 U/L 81    AST      0 - 45 U/L 27    ALT      0 - 50 U/L 25    Protein Total      6.4 - 8.3 g/dL 6.1 (L)    Albumin      3.5 - 5.2 g/dL 3.9    Bilirubin Total      <=1.2 mg/dL 0.7    WBC      4.0 - 11.0 10e3/uL 6.0    RBC Count      3.80 - 5.20 10e6/uL 2.96 (L)    Hemoglobin      11.7 - 15.7 g/dL 8.8 (L)    Hematocrit      35.0 - 47.0 % 26.9 (L)    MCV      78 - 100 fL 91    MCH      26.5 " - 33.0 pg 29.7    MCHC      31.5 - 36.5 g/dL 32.7    RDW      10.0 - 15.0 % 13.1    Platelet Count      150 - 450 10e3/uL 234    Iron      37 - 145 ug/dL 36 (L)    Iron Binding Capacity      240 - 430 ug/dL 329    Iron Sat Index      15 - 46 % 11 (L)    ABO/Rh(D) O POS    Antibody Screen      Negative  Negative    SPECIMEN EXPIRATION DATE [id number]    TSH      0.30 - 4.20 uIU/mL 2.43    Ferritin      6 - 175 ng/mL 14

## 2024-10-24 ENCOUNTER — TELEPHONE (OUTPATIENT)
Dept: OBGYN | Facility: CLINIC | Age: 49
End: 2024-10-24
Payer: COMMERCIAL

## 2024-10-24 PROBLEM — D62 ANEMIA DUE TO BLOOD LOSS, ACUTE: Status: ACTIVE | Noted: 2024-10-24

## 2024-10-24 RX ORDER — ALBUTEROL SULFATE 90 UG/1
1-2 INHALANT RESPIRATORY (INHALATION)
Status: CANCELLED
Start: 2024-10-24

## 2024-10-24 RX ORDER — DIPHENHYDRAMINE HYDROCHLORIDE 50 MG/ML
25 INJECTION INTRAMUSCULAR; INTRAVENOUS
Status: CANCELLED
Start: 2024-10-24

## 2024-10-24 RX ORDER — DIPHENHYDRAMINE HYDROCHLORIDE 50 MG/ML
50 INJECTION INTRAMUSCULAR; INTRAVENOUS
Status: CANCELLED
Start: 2024-10-24

## 2024-10-24 RX ORDER — MEPERIDINE HYDROCHLORIDE 25 MG/ML
25 INJECTION INTRAMUSCULAR; INTRAVENOUS; SUBCUTANEOUS
Status: CANCELLED | OUTPATIENT
Start: 2024-10-24

## 2024-10-24 RX ORDER — HEPARIN SODIUM,PORCINE 10 UNIT/ML
5-20 VIAL (ML) INTRAVENOUS DAILY PRN
Status: CANCELLED | OUTPATIENT
Start: 2024-10-24

## 2024-10-24 RX ORDER — ALBUTEROL SULFATE 0.83 MG/ML
2.5 SOLUTION RESPIRATORY (INHALATION)
Status: CANCELLED | OUTPATIENT
Start: 2024-10-24

## 2024-10-24 RX ORDER — HEPARIN SODIUM (PORCINE) LOCK FLUSH IV SOLN 100 UNIT/ML 100 UNIT/ML
5 SOLUTION INTRAVENOUS
Status: CANCELLED | OUTPATIENT
Start: 2024-10-24

## 2024-10-24 RX ORDER — EPINEPHRINE 1 MG/ML
0.3 INJECTION, SOLUTION, CONCENTRATE INTRAVENOUS EVERY 5 MIN PRN
Status: CANCELLED | OUTPATIENT
Start: 2024-10-24

## 2024-10-24 RX ORDER — METHYLPREDNISOLONE SODIUM SUCCINATE 40 MG/ML
40 INJECTION INTRAMUSCULAR; INTRAVENOUS
Status: CANCELLED
Start: 2024-10-24

## 2024-10-24 NOTE — TELEPHONE ENCOUNTER
Completed paperwork was given to Dr. Bowles to sign.    -Georgette RAMIREZ [facility] [place]

## 2024-10-28 NOTE — TELEPHONE ENCOUNTER
The completed paperwork was faxed.    The copies are placed up front for a short time; then they will be sent to scan into patient's record.    Fidelina Gonzalez  Patient   [phone]

## 2024-11-02 ENCOUNTER — HEALTH MAINTENANCE LETTER (OUTPATIENT)
Age: 49
End: 2024-11-02

## 2024-11-05 ENCOUNTER — INFUSION THERAPY VISIT (OUTPATIENT)
Dept: INFUSION THERAPY | Facility: CLINIC | Age: 49
End: 2024-11-05
Attending: STUDENT IN AN ORGANIZED HEALTH CARE EDUCATION/TRAINING PROGRAM
Payer: COMMERCIAL

## 2024-11-05 VITALS — SYSTOLIC BLOOD PRESSURE: 119 MMHG | DIASTOLIC BLOOD PRESSURE: 63 MMHG | HEART RATE: 91 BPM | TEMPERATURE: 98 F

## 2024-11-05 DIAGNOSIS — D62 ANEMIA DUE TO BLOOD LOSS, ACUTE: Primary | ICD-10-CM

## 2024-11-05 PROCEDURE — 258N000003 HC RX IP 258 OP 636: Performed by: STUDENT IN AN ORGANIZED HEALTH CARE EDUCATION/TRAINING PROGRAM

## 2024-11-05 PROCEDURE — 250N000011 HC RX IP 250 OP 636: Performed by: STUDENT IN AN ORGANIZED HEALTH CARE EDUCATION/TRAINING PROGRAM

## 2024-11-05 PROCEDURE — 96365 THER/PROPH/DIAG IV INF INIT: CPT

## 2024-11-05 PROCEDURE — 96366 THER/PROPH/DIAG IV INF ADDON: CPT

## 2024-11-05 RX ORDER — HEPARIN SODIUM (PORCINE) LOCK FLUSH IV SOLN 100 UNIT/ML 100 UNIT/ML
5 SOLUTION INTRAVENOUS
Status: CANCELLED | OUTPATIENT
Start: 2024-11-07

## 2024-11-05 RX ORDER — ALBUTEROL SULFATE 0.83 MG/ML
2.5 SOLUTION RESPIRATORY (INHALATION)
Status: CANCELLED | OUTPATIENT
Start: 2024-11-07

## 2024-11-05 RX ORDER — METHYLPREDNISOLONE SODIUM SUCCINATE 40 MG/ML
40 INJECTION INTRAMUSCULAR; INTRAVENOUS
Status: CANCELLED
Start: 2024-11-07

## 2024-11-05 RX ORDER — EPINEPHRINE 1 MG/ML
0.3 INJECTION, SOLUTION, CONCENTRATE INTRAVENOUS EVERY 5 MIN PRN
Status: CANCELLED | OUTPATIENT
Start: 2024-11-07

## 2024-11-05 RX ORDER — DIPHENHYDRAMINE HYDROCHLORIDE 50 MG/ML
50 INJECTION INTRAMUSCULAR; INTRAVENOUS
Status: CANCELLED
Start: 2024-11-07

## 2024-11-05 RX ORDER — ALBUTEROL SULFATE 90 UG/1
1-2 INHALANT RESPIRATORY (INHALATION)
Status: CANCELLED
Start: 2024-11-07

## 2024-11-05 RX ORDER — DIPHENHYDRAMINE HYDROCHLORIDE 50 MG/ML
25 INJECTION INTRAMUSCULAR; INTRAVENOUS
Status: CANCELLED
Start: 2024-11-07

## 2024-11-05 RX ORDER — MEPERIDINE HYDROCHLORIDE 25 MG/ML
25 INJECTION INTRAMUSCULAR; INTRAVENOUS; SUBCUTANEOUS
Status: CANCELLED | OUTPATIENT
Start: 2024-11-07

## 2024-11-05 RX ORDER — HEPARIN SODIUM,PORCINE 10 UNIT/ML
5-20 VIAL (ML) INTRAVENOUS DAILY PRN
Status: CANCELLED | OUTPATIENT
Start: 2024-11-07

## 2024-11-05 RX ADMIN — SODIUM CHLORIDE 50 ML: 9 INJECTION, SOLUTION INTRAVENOUS at 09:29

## 2024-11-05 RX ADMIN — IRON SUCROSE 300 MG: 20 INJECTION, SOLUTION INTRAVENOUS at 09:21

## 2024-11-05 NOTE — PROGRESS NOTES
Infusion Nursing Note:  Emilie HINSON Barbsharri presents today for 1st dose of iron sucrose.    Patient seen by provider today: No   present during visit today: Not Applicable.    Note: N/A.      Intravenous Access:  Peripheral IV placed.    Treatment Conditions:  Not Applicable.      Post Infusion Assessment:  Patient tolerated infusion without incident.  Pt observed for 30 minutes following iron infusion per protocol.  No evidence of extravasations.  Access discontinued per protocol.      Discharge Plan:   AVS to patient via MYCHART.  Patient will return 11/8 for next appointment.   Patient discharged in stable condition accompanied by: self.  Departure Mode: Ambulatory.      Julia Nicholson RN

## 2024-11-08 ENCOUNTER — INFUSION THERAPY VISIT (OUTPATIENT)
Dept: INFUSION THERAPY | Facility: CLINIC | Age: 49
End: 2024-11-08
Attending: STUDENT IN AN ORGANIZED HEALTH CARE EDUCATION/TRAINING PROGRAM
Payer: COMMERCIAL

## 2024-11-08 VITALS
DIASTOLIC BLOOD PRESSURE: 65 MMHG | RESPIRATION RATE: 18 BRPM | HEART RATE: 71 BPM | TEMPERATURE: 97.8 F | SYSTOLIC BLOOD PRESSURE: 104 MMHG

## 2024-11-08 DIAGNOSIS — D62 ANEMIA DUE TO BLOOD LOSS, ACUTE: Primary | ICD-10-CM

## 2024-11-08 PROCEDURE — 96365 THER/PROPH/DIAG IV INF INIT: CPT

## 2024-11-08 PROCEDURE — 250N000011 HC RX IP 250 OP 636: Performed by: STUDENT IN AN ORGANIZED HEALTH CARE EDUCATION/TRAINING PROGRAM

## 2024-11-08 PROCEDURE — 258N000003 HC RX IP 258 OP 636: Performed by: STUDENT IN AN ORGANIZED HEALTH CARE EDUCATION/TRAINING PROGRAM

## 2024-11-08 PROCEDURE — 96366 THER/PROPH/DIAG IV INF ADDON: CPT

## 2024-11-08 RX ORDER — ALBUTEROL SULFATE 90 UG/1
1-2 INHALANT RESPIRATORY (INHALATION)
Status: CANCELLED
Start: 2024-11-09

## 2024-11-08 RX ORDER — DIPHENHYDRAMINE HYDROCHLORIDE 50 MG/ML
50 INJECTION INTRAMUSCULAR; INTRAVENOUS
Status: CANCELLED
Start: 2024-11-09

## 2024-11-08 RX ORDER — EPINEPHRINE 1 MG/ML
0.3 INJECTION, SOLUTION, CONCENTRATE INTRAVENOUS EVERY 5 MIN PRN
Status: CANCELLED | OUTPATIENT
Start: 2024-11-09

## 2024-11-08 RX ORDER — HEPARIN SODIUM,PORCINE 10 UNIT/ML
5-20 VIAL (ML) INTRAVENOUS DAILY PRN
Status: CANCELLED | OUTPATIENT
Start: 2024-11-09

## 2024-11-08 RX ORDER — HEPARIN SODIUM (PORCINE) LOCK FLUSH IV SOLN 100 UNIT/ML 100 UNIT/ML
5 SOLUTION INTRAVENOUS
Status: CANCELLED | OUTPATIENT
Start: 2024-11-09

## 2024-11-08 RX ORDER — MEPERIDINE HYDROCHLORIDE 25 MG/ML
25 INJECTION INTRAMUSCULAR; INTRAVENOUS; SUBCUTANEOUS
Status: CANCELLED | OUTPATIENT
Start: 2024-11-09

## 2024-11-08 RX ORDER — ALBUTEROL SULFATE 0.83 MG/ML
2.5 SOLUTION RESPIRATORY (INHALATION)
Status: CANCELLED | OUTPATIENT
Start: 2024-11-09

## 2024-11-08 RX ORDER — DIPHENHYDRAMINE HYDROCHLORIDE 50 MG/ML
25 INJECTION INTRAMUSCULAR; INTRAVENOUS
Status: CANCELLED
Start: 2024-11-09

## 2024-11-08 RX ORDER — METHYLPREDNISOLONE SODIUM SUCCINATE 40 MG/ML
40 INJECTION INTRAMUSCULAR; INTRAVENOUS
Status: CANCELLED
Start: 2024-11-09

## 2024-11-08 RX ADMIN — IRON SUCROSE 300 MG: 20 INJECTION, SOLUTION INTRAVENOUS at 09:37

## 2024-11-08 ASSESSMENT — PAIN SCALES - GENERAL: PAINLEVEL_OUTOF10: NO PAIN (0)

## 2024-11-08 NOTE — PROGRESS NOTES
Infusion Nursing Note:  Emilie Kruse presents today for Venofer.    Patient seen by provider today: No   present during visit today: Not Applicable.    Note: N/A.    Intravenous Access:  Peripheral IV placed.    Treatment Conditions:  Not Applicable.    Post Infusion Assessment:  Patient tolerated infusion without incident.  Patient observed for 30 minutes post Venofer per protocol.  Blood return noted pre and post infusion.  Site patent and intact, free from redness, edema or discomfort.  No evidence of extravasations.  Access discontinued per protocol.     Discharge Plan:   Discharge instructions reviewed with: Patient.  Patient and/or family verbalized understanding of discharge instructions and all questions answered.  AVS to patient via [employer].  Patient will return 11/12/2024 for next appointment.   Patient discharged in stable condition accompanied by: self.  Departure Mode: Ambulatory.    Alayna Mcghee RN

## 2024-11-12 ENCOUNTER — INFUSION THERAPY VISIT (OUTPATIENT)
Dept: INFUSION THERAPY | Facility: CLINIC | Age: 49
End: 2024-11-12
Attending: STUDENT IN AN ORGANIZED HEALTH CARE EDUCATION/TRAINING PROGRAM
Payer: COMMERCIAL

## 2024-11-12 VITALS — DIASTOLIC BLOOD PRESSURE: 55 MMHG | TEMPERATURE: 97.8 F | HEART RATE: 73 BPM | SYSTOLIC BLOOD PRESSURE: 111 MMHG

## 2024-11-12 DIAGNOSIS — D62 ANEMIA DUE TO BLOOD LOSS, ACUTE: Primary | ICD-10-CM

## 2024-11-12 PROCEDURE — 258N000003 HC RX IP 258 OP 636: Performed by: STUDENT IN AN ORGANIZED HEALTH CARE EDUCATION/TRAINING PROGRAM

## 2024-11-12 PROCEDURE — 96365 THER/PROPH/DIAG IV INF INIT: CPT

## 2024-11-12 PROCEDURE — 250N000011 HC RX IP 250 OP 636: Performed by: STUDENT IN AN ORGANIZED HEALTH CARE EDUCATION/TRAINING PROGRAM

## 2024-11-12 PROCEDURE — 96366 THER/PROPH/DIAG IV INF ADDON: CPT

## 2024-11-12 RX ORDER — HEPARIN SODIUM (PORCINE) LOCK FLUSH IV SOLN 100 UNIT/ML 100 UNIT/ML
5 SOLUTION INTRAVENOUS
Status: CANCELLED | OUTPATIENT
Start: 2024-11-12

## 2024-11-12 RX ORDER — DIPHENHYDRAMINE HYDROCHLORIDE 50 MG/ML
50 INJECTION INTRAMUSCULAR; INTRAVENOUS
Status: CANCELLED
Start: 2024-11-12

## 2024-11-12 RX ORDER — DIPHENHYDRAMINE HYDROCHLORIDE 50 MG/ML
25 INJECTION INTRAMUSCULAR; INTRAVENOUS
Status: CANCELLED
Start: 2024-11-12

## 2024-11-12 RX ORDER — HEPARIN SODIUM,PORCINE 10 UNIT/ML
5-20 VIAL (ML) INTRAVENOUS DAILY PRN
Status: CANCELLED | OUTPATIENT
Start: 2024-11-12

## 2024-11-12 RX ORDER — EPINEPHRINE 1 MG/ML
0.3 INJECTION, SOLUTION, CONCENTRATE INTRAVENOUS EVERY 5 MIN PRN
Status: CANCELLED | OUTPATIENT
Start: 2024-11-12

## 2024-11-12 RX ORDER — MEPERIDINE HYDROCHLORIDE 25 MG/ML
25 INJECTION INTRAMUSCULAR; INTRAVENOUS; SUBCUTANEOUS
Status: CANCELLED | OUTPATIENT
Start: 2024-11-12

## 2024-11-12 RX ORDER — METHYLPREDNISOLONE SODIUM SUCCINATE 40 MG/ML
40 INJECTION INTRAMUSCULAR; INTRAVENOUS
Status: CANCELLED
Start: 2024-11-12

## 2024-11-12 RX ORDER — ALBUTEROL SULFATE 90 UG/1
1-2 INHALANT RESPIRATORY (INHALATION)
Status: CANCELLED
Start: 2024-11-12

## 2024-11-12 RX ORDER — ALBUTEROL SULFATE 0.83 MG/ML
2.5 SOLUTION RESPIRATORY (INHALATION)
Status: CANCELLED | OUTPATIENT
Start: 2024-11-12

## 2024-11-12 RX ADMIN — IRON SUCROSE 300 MG: 20 INJECTION, SOLUTION INTRAVENOUS at 09:48

## 2024-11-12 NOTE — PROGRESS NOTES
Infusion Nursing Note:  Emilie HINSON Uriah presents today for Venofer 3/3.    Patient seen by provider today: No   present during visit today: Not Applicable.    Note: N/A.    Intravenous Access:  Peripheral IV placed.    Treatment Conditions:  Not Applicable.    Post Infusion Assessment:  Patient tolerated infusion without incident.  Patient observed for 30 minutes post Venofer.  Blood return noted pre and post infusion.  Site patent and intact, free from redness, edema or discomfort.  No evidence of extravasations.  Access discontinued per protocol.     Discharge Plan:   Patient and/or family verbalized understanding of discharge instructions and all questions answered.  Patient discharged in stable condition accompanied by: self.  Departure Mode: Ambulatory.    Leroy Roa RN

## 2024-11-25 DIAGNOSIS — N93.9 VAGINAL BLEEDING: ICD-10-CM

## 2024-11-25 RX ORDER — TRANEXAMIC ACID 650 MG/1
1300 TABLET ORAL 3 TIMES DAILY
Qty: 30 TABLET | Refills: 3 | Status: SHIPPED | OUTPATIENT
Start: 2024-11-25

## 2024-11-25 NOTE — TELEPHONE ENCOUNTER
Last Written Prescription Date: 10/23/24  Last Fill Quantity: 30,  # refills: 3   Last office visit: 10/23/24 with Dr Bowles  Future Office Visit:  12/31/24 with Dr Bowles for postop hyst (12.17.24)      Requested Prescriptions   Pending Prescriptions Disp Refills    tranexamic acid (LYSTEDA) 650 MG tablet 30 tablet 3     Sig: Take 2 tablets (1,300 mg) by mouth 3 times daily.       There is no refill protocol information for this order        Routing refill request to provider for review/approval because:  Drug not on the G refill protocol     KATHLEEN Sapp  [facility]  Ob/Gyn Clinic

## 2024-12-16 ENCOUNTER — ANESTHESIA EVENT (OUTPATIENT)
Dept: SURGERY | Facility: CLINIC | Age: 49
End: 2024-12-16
Payer: COMMERCIAL

## 2024-12-16 NOTE — ANESTHESIA PREPROCEDURE EVALUATION
Anesthesia Pre-Procedure Evaluation    Patient: Emilie Kruse   MRN: [id number] : 1975        Procedure : Procedure(s):  HYSTERECTOMY, ROBOT-ASSISTED, LAPAROSCOPIC, USING DA AIMEE XI; bilateral salpingo-oophorectomy, possible cystoscopy          Past Medical History:   Diagnosis Date     Diabetes mellitus (H)      HTN (hypertension)       Past Surgical History:   Procedure Laterality Date     CARPAL TUNNEL RELEASE RT/LT        SECTION      x2     HYSTEROSCOPY       LAPAROSCOPIC CHOLECYSTECTOMY       spine fushion       XR FOOT SURGERY JOSE LEFT        No Known Allergies   Social History     Tobacco Use     Smoking status: Never     Passive exposure: Never     Smokeless tobacco: Never   Substance Use Topics     Alcohol use: No      Wt Readings from Last 1 Encounters:   10/23/24 102.1 kg (225 lb)        Anesthesia Evaluation   Pt has had prior anesthetic. Type: Regional, MAC and General.        ROS/MED HX  ENT/Pulmonary:     (+)     KRYSTYNA risk factors,  hypertension, obese,                                Neurologic:  - neg neurologic ROS     Cardiovascular:     (+) Dyslipidemia hypertension- -   -  - -                                      METS/Exercise Tolerance:     Hematologic:     (+)      anemia,          Musculoskeletal: Comment: Spinal stenosis  S/P Spinal fusion    (+)  arthritis,             GI/Hepatic:  - neg GI/hepatic ROS     Renal/Genitourinary:  - neg Renal ROS     Endo:     (+)  type II DM, Last HgA1c: 7.3, date: 24,           Obesity (Morbid),       Psychiatric/Substance Use:  - neg psychiatric ROS     Infectious Disease:  - neg infectious disease ROS     Malignancy:  - neg malignancy ROS     Other:  - neg other ROS          Physical Exam    Airway        Mallampati: II   TM distance: > 3 FB   Neck ROM: full   Mouth opening: > 3 cm    Respiratory Devices and Support         Dental           Cardiovascular   cardiovascular exam normal          Pulmonary   pulmonary exam normal             OUTSIDE LABS:  CBC:   Lab Results   Component Value Date    WBC 6.0 10/23/2024    WBC 6.7 10/20/2024    HGB 8.8 (L) 10/23/2024    HGB 10.7 (L) 10/20/2024    HCT 26.9 (L) 10/23/2024    HCT 31.2 (L) 10/20/2024     10/23/2024     10/20/2024     BMP:   Lab Results   Component Value Date     (L) 10/23/2024     10/20/2024    POTASSIUM 4.0 10/23/2024    POTASSIUM 4.1 10/20/2024    CHLORIDE 101 10/23/2024    CHLORIDE 104 10/20/2024    CO2 24 10/23/2024    CO2 21 (L) 10/20/2024    BUN 14.0 10/23/2024    BUN 18.0 10/20/2024    CR 0.76 10/23/2024    CR 0.82 10/20/2024     (H) 10/23/2024     (H) 10/20/2024     COAGS:   Lab Results   Component Value Date    INR 1.04 04/11/2021     POC:   Lab Results   Component Value Date    HCG Negative 11/13/2006    HCGS Negative 10/20/2024     HEPATIC:   Lab Results   Component Value Date    ALBUMIN 3.9 10/23/2024    PROTTOTAL 6.1 (L) 10/23/2024    ALT 25 10/23/2024    AST 27 10/23/2024    ALKPHOS 81 10/23/2024    BILITOTAL 0.7 10/23/2024     OTHER:   Lab Results   Component Value Date    LACT 1.5 04/11/2021    LIOR 8.8 10/23/2024    TSH 2.43 10/23/2024       Anesthesia Plan    ASA Status:  3       Anesthesia Type: General.     - Airway: ETT   Induction: Propofol.   Maintenance: Balanced.        Consents    Anesthesia Plan(s) and associated risks, benefits, and realistic alternatives discussed. Questions answered and patient/representative(s) expressed understanding.     - Discussed: Risks, Benefits and Alternatives for BOTH SEDATION and the PROCEDURE were discussed     - Discussed with:  Patient            Postoperative Care    Pain management: IV analgesics, Oral pain medications, Multi-modal analgesia.   PONV prophylaxis: Ondansetron (or other 5HT-3), Dexamethasone or Solumedrol     Comments:             DEVAN Armstrong CRNA    I have reviewed the pertinent notes and labs in the chart from the past 30 days and (re)examined the patient.   Any updates or changes from those notes are reflected in this note.               # Hypertension: Noted on problem list

## 2024-12-17 ENCOUNTER — ANESTHESIA (OUTPATIENT)
Dept: SURGERY | Facility: CLINIC | Age: 49
End: 2024-12-17
Payer: COMMERCIAL

## 2024-12-17 ENCOUNTER — HOSPITAL ENCOUNTER (OUTPATIENT)
Facility: CLINIC | Age: 49
Discharge: HOME | End: 2024-12-18
Attending: STUDENT IN AN ORGANIZED HEALTH CARE EDUCATION/TRAINING PROGRAM | Admitting: STUDENT IN AN ORGANIZED HEALTH CARE EDUCATION/TRAINING PROGRAM
Payer: COMMERCIAL

## 2024-12-17 DIAGNOSIS — S37.20XA INJURY OF BLADDER, INITIAL ENCOUNTER: ICD-10-CM

## 2024-12-17 DIAGNOSIS — Z90.710 S/P HYSTERECTOMY: Primary | ICD-10-CM

## 2024-12-17 LAB
GLUCOSE BLDC GLUCOMTR-MCNC: 142 MG/DL (ref 70–99)
GLUCOSE BLDC GLUCOMTR-MCNC: 199 MG/DL (ref 70–99)
GLUCOSE BLDC GLUCOMTR-MCNC: 210 MG/DL (ref 70–99)
GLUCOSE BLDC GLUCOMTR-MCNC: 250 MG/DL (ref 70–99)
GLUCOSE BLDC GLUCOMTR-MCNC: 252 MG/DL (ref 70–99)
HCG UR QL: NEGATIVE

## 2024-12-17 PROCEDURE — 250N000011 HC RX IP 250 OP 636: Performed by: NURSE ANESTHETIST, CERTIFIED REGISTERED

## 2024-12-17 PROCEDURE — 88307 TISSUE EXAM BY PATHOLOGIST: CPT | Mod: 26 | Performed by: PATHOLOGY

## 2024-12-17 PROCEDURE — 370N000017 HC ANESTHESIA TECHNICAL FEE, PER MIN: Performed by: STUDENT IN AN ORGANIZED HEALTH CARE EDUCATION/TRAINING PROGRAM

## 2024-12-17 PROCEDURE — 58571 TLH W/T/O 250 G OR LESS: CPT | Mod: AS | Performed by: PHYSICIAN ASSISTANT

## 2024-12-17 PROCEDURE — 999N000141 HC STATISTIC PRE-PROCEDURE NURSING ASSESSMENT: Performed by: STUDENT IN AN ORGANIZED HEALTH CARE EDUCATION/TRAINING PROGRAM

## 2024-12-17 PROCEDURE — 58571 TLH W/T/O 250 G OR LESS: CPT | Performed by: STUDENT IN AN ORGANIZED HEALTH CARE EDUCATION/TRAINING PROGRAM

## 2024-12-17 PROCEDURE — 250N000013 HC RX MED GY IP 250 OP 250 PS 637: Performed by: STUDENT IN AN ORGANIZED HEALTH CARE EDUCATION/TRAINING PROGRAM

## 2024-12-17 PROCEDURE — 250N000009 HC RX 250: Performed by: STUDENT IN AN ORGANIZED HEALTH CARE EDUCATION/TRAINING PROGRAM

## 2024-12-17 PROCEDURE — 710N000009 HC RECOVERY PHASE 1, LEVEL 1, PER MIN: Performed by: STUDENT IN AN ORGANIZED HEALTH CARE EDUCATION/TRAINING PROGRAM

## 2024-12-17 PROCEDURE — 81025 URINE PREGNANCY TEST: CPT | Performed by: STUDENT IN AN ORGANIZED HEALTH CARE EDUCATION/TRAINING PROGRAM

## 2024-12-17 PROCEDURE — 360N000080 HC SURGERY LEVEL 7, PER MIN: Performed by: STUDENT IN AN ORGANIZED HEALTH CARE EDUCATION/TRAINING PROGRAM

## 2024-12-17 PROCEDURE — 258N000003 HC RX IP 258 OP 636: Performed by: NURSE ANESTHETIST, CERTIFIED REGISTERED

## 2024-12-17 PROCEDURE — 88307 TISSUE EXAM BY PATHOLOGIST: CPT | Mod: TC | Performed by: STUDENT IN AN ORGANIZED HEALTH CARE EDUCATION/TRAINING PROGRAM

## 2024-12-17 PROCEDURE — 250N000011 HC RX IP 250 OP 636: Performed by: STUDENT IN AN ORGANIZED HEALTH CARE EDUCATION/TRAINING PROGRAM

## 2024-12-17 PROCEDURE — 250N000013 HC RX MED GY IP 250 OP 250 PS 637: Performed by: NURSE ANESTHETIST, CERTIFIED REGISTERED

## 2024-12-17 PROCEDURE — 250N000009 HC RX 250: Performed by: NURSE ANESTHETIST, CERTIFIED REGISTERED

## 2024-12-17 PROCEDURE — 250N000011 HC RX IP 250 OP 636: Mod: JZ | Performed by: STUDENT IN AN ORGANIZED HEALTH CARE EDUCATION/TRAINING PROGRAM

## 2024-12-17 PROCEDURE — 250N000012 HC RX MED GY IP 250 OP 636 PS 637: Performed by: NURSE ANESTHETIST, CERTIFIED REGISTERED

## 2024-12-17 PROCEDURE — 271N000001 HC OR GENERAL SUPPLY NON-STERILE: Performed by: STUDENT IN AN ORGANIZED HEALTH CARE EDUCATION/TRAINING PROGRAM

## 2024-12-17 PROCEDURE — 272N000001 HC OR GENERAL SUPPLY STERILE: Performed by: STUDENT IN AN ORGANIZED HEALTH CARE EDUCATION/TRAINING PROGRAM

## 2024-12-17 PROCEDURE — 250N000012 HC RX MED GY IP 250 OP 636 PS 637: Performed by: OBSTETRICS & GYNECOLOGY

## 2024-12-17 PROCEDURE — 82962 GLUCOSE BLOOD TEST: CPT

## 2024-12-17 RX ORDER — NALOXONE HYDROCHLORIDE 0.4 MG/ML
0.2 INJECTION, SOLUTION INTRAMUSCULAR; INTRAVENOUS; SUBCUTANEOUS
Status: DISCONTINUED | OUTPATIENT
Start: 2024-12-17 | End: 2024-12-18 | Stop reason: HOSPADM

## 2024-12-17 RX ORDER — AMOXICILLIN 250 MG
1 CAPSULE ORAL 2 TIMES DAILY
Status: DISCONTINUED | OUTPATIENT
Start: 2024-12-17 | End: 2024-12-18 | Stop reason: HOSPADM

## 2024-12-17 RX ORDER — OXYCODONE HYDROCHLORIDE 5 MG/1
5 TABLET ORAL
Status: DISCONTINUED | OUTPATIENT
Start: 2024-12-17 | End: 2024-12-17 | Stop reason: DRUGHIGH

## 2024-12-17 RX ORDER — ONDANSETRON 4 MG/1
4 TABLET, ORALLY DISINTEGRATING ORAL EVERY 6 HOURS PRN
Status: DISCONTINUED | OUTPATIENT
Start: 2024-12-17 | End: 2024-12-18 | Stop reason: HOSPADM

## 2024-12-17 RX ORDER — PHENAZOPYRIDINE HYDROCHLORIDE 200 MG/1
200 TABLET, FILM COATED ORAL ONCE
Status: COMPLETED | OUTPATIENT
Start: 2024-12-17 | End: 2024-12-17

## 2024-12-17 RX ORDER — ONDANSETRON 2 MG/ML
4 INJECTION INTRAMUSCULAR; INTRAVENOUS EVERY 30 MIN PRN
Status: DISCONTINUED | OUTPATIENT
Start: 2024-12-17 | End: 2024-12-18 | Stop reason: HOSPADM

## 2024-12-17 RX ORDER — ACETAMINOPHEN 325 MG/1
975 TABLET ORAL ONCE
Status: DISCONTINUED | OUTPATIENT
Start: 2024-12-17 | End: 2024-12-17 | Stop reason: DRUGHIGH

## 2024-12-17 RX ORDER — ACETAMINOPHEN 325 MG/1
650 TABLET ORAL EVERY 6 HOURS
Status: DISCONTINUED | OUTPATIENT
Start: 2024-12-20 | End: 2024-12-18 | Stop reason: HOSPADM

## 2024-12-17 RX ORDER — HYDROMORPHONE HCL IN WATER/PF 6 MG/30 ML
0.2 PATIENT CONTROLLED ANALGESIA SYRINGE INTRAVENOUS EVERY 5 MIN PRN
Status: DISCONTINUED | OUTPATIENT
Start: 2024-12-17 | End: 2024-12-17 | Stop reason: HOSPADM

## 2024-12-17 RX ORDER — KETOROLAC TROMETHAMINE 30 MG/ML
INJECTION, SOLUTION INTRAMUSCULAR; INTRAVENOUS PRN
Status: DISCONTINUED | OUTPATIENT
Start: 2024-12-17 | End: 2024-12-17

## 2024-12-17 RX ORDER — NICOTINE POLACRILEX 4 MG
15-30 LOZENGE BUCCAL
Status: DISCONTINUED | OUTPATIENT
Start: 2024-12-17 | End: 2024-12-18 | Stop reason: HOSPADM

## 2024-12-17 RX ORDER — HYDROMORPHONE HCL IN WATER/PF 6 MG/30 ML
0.2 PATIENT CONTROLLED ANALGESIA SYRINGE INTRAVENOUS
Status: DISCONTINUED | OUTPATIENT
Start: 2024-12-17 | End: 2024-12-18 | Stop reason: HOSPADM

## 2024-12-17 RX ORDER — DEXAMETHASONE SODIUM PHOSPHATE 4 MG/ML
4 INJECTION, SOLUTION INTRA-ARTICULAR; INTRALESIONAL; INTRAMUSCULAR; INTRAVENOUS; SOFT TISSUE
Status: DISCONTINUED | OUTPATIENT
Start: 2024-12-17 | End: 2024-12-17 | Stop reason: HOSPADM

## 2024-12-17 RX ORDER — MAGNESIUM SULFATE HEPTAHYDRATE 40 MG/ML
INJECTION, SOLUTION INTRAVENOUS PRN
Status: DISCONTINUED | OUTPATIENT
Start: 2024-12-17 | End: 2024-12-17

## 2024-12-17 RX ORDER — IBUPROFEN 600 MG/1
600 TABLET, FILM COATED ORAL ONCE
Status: DISCONTINUED | OUTPATIENT
Start: 2024-12-17 | End: 2024-12-17 | Stop reason: DRUGHIGH

## 2024-12-17 RX ORDER — METRONIDAZOLE 500 MG/100ML
500 INJECTION, SOLUTION INTRAVENOUS
Status: COMPLETED | OUTPATIENT
Start: 2024-12-17 | End: 2024-12-17

## 2024-12-17 RX ORDER — PROPOFOL 10 MG/ML
INJECTION, EMULSION INTRAVENOUS PRN
Status: DISCONTINUED | OUTPATIENT
Start: 2024-12-17 | End: 2024-12-17

## 2024-12-17 RX ORDER — LIDOCAINE 40 MG/G
CREAM TOPICAL
Status: DISCONTINUED | OUTPATIENT
Start: 2024-12-17 | End: 2024-12-17 | Stop reason: HOSPADM

## 2024-12-17 RX ORDER — ACETAMINOPHEN 325 MG/1
975 TABLET ORAL ONCE
Status: COMPLETED | OUTPATIENT
Start: 2024-12-17 | End: 2024-12-17

## 2024-12-17 RX ORDER — DEXAMETHASONE SODIUM PHOSPHATE 4 MG/ML
4 INJECTION, SOLUTION INTRA-ARTICULAR; INTRALESIONAL; INTRAMUSCULAR; INTRAVENOUS; SOFT TISSUE
Status: DISCONTINUED | OUTPATIENT
Start: 2024-12-17 | End: 2024-12-18 | Stop reason: HOSPADM

## 2024-12-17 RX ORDER — PROCHLORPERAZINE MALEATE 5 MG/1
10 TABLET ORAL EVERY 6 HOURS PRN
Status: DISCONTINUED | OUTPATIENT
Start: 2024-12-17 | End: 2024-12-18 | Stop reason: HOSPADM

## 2024-12-17 RX ORDER — DIMENHYDRINATE 50 MG/ML
INJECTION, SOLUTION INTRAMUSCULAR; INTRAVENOUS PRN
Status: DISCONTINUED | OUTPATIENT
Start: 2024-12-17 | End: 2024-12-17

## 2024-12-17 RX ORDER — ONDANSETRON 4 MG/1
4 TABLET, ORALLY DISINTEGRATING ORAL EVERY 30 MIN PRN
Status: DISCONTINUED | OUTPATIENT
Start: 2024-12-17 | End: 2024-12-17 | Stop reason: HOSPADM

## 2024-12-17 RX ORDER — CEPHALEXIN 500 MG/1
500 CAPSULE ORAL DAILY
Qty: 14 CAPSULE | Refills: 0 | Status: SHIPPED | OUTPATIENT
Start: 2024-12-17

## 2024-12-17 RX ORDER — ONDANSETRON 2 MG/ML
4 INJECTION INTRAMUSCULAR; INTRAVENOUS EVERY 6 HOURS PRN
Status: DISCONTINUED | OUTPATIENT
Start: 2024-12-17 | End: 2024-12-18 | Stop reason: HOSPADM

## 2024-12-17 RX ORDER — POLYETHYLENE GLYCOL 3350 17 G/17G
1 POWDER, FOR SOLUTION ORAL DAILY
Qty: 527 G | Refills: 0 | Status: SHIPPED | OUTPATIENT
Start: 2024-12-17

## 2024-12-17 RX ORDER — DIMENHYDRINATE 50 MG/ML
25 INJECTION, SOLUTION INTRAMUSCULAR; INTRAVENOUS
Status: DISCONTINUED | OUTPATIENT
Start: 2024-12-17 | End: 2024-12-18 | Stop reason: HOSPADM

## 2024-12-17 RX ORDER — HYDROMORPHONE HCL IN WATER/PF 6 MG/30 ML
0.4 PATIENT CONTROLLED ANALGESIA SYRINGE INTRAVENOUS EVERY 5 MIN PRN
Status: DISCONTINUED | OUTPATIENT
Start: 2024-12-17 | End: 2024-12-17 | Stop reason: HOSPADM

## 2024-12-17 RX ORDER — CEFAZOLIN SODIUM/WATER 2 G/20 ML
2 SYRINGE (ML) INTRAVENOUS
Status: COMPLETED | OUTPATIENT
Start: 2024-12-17 | End: 2024-12-17

## 2024-12-17 RX ORDER — POLYETHYLENE GLYCOL 3350 17 G/17G
17 POWDER, FOR SOLUTION ORAL DAILY PRN
Status: DISCONTINUED | OUTPATIENT
Start: 2024-12-18 | End: 2024-12-18 | Stop reason: HOSPADM

## 2024-12-17 RX ORDER — NALOXONE HYDROCHLORIDE 0.4 MG/ML
0.4 INJECTION, SOLUTION INTRAMUSCULAR; INTRAVENOUS; SUBCUTANEOUS
Status: DISCONTINUED | OUTPATIENT
Start: 2024-12-17 | End: 2024-12-18 | Stop reason: HOSPADM

## 2024-12-17 RX ORDER — IBUPROFEN 600 MG/1
600 TABLET, FILM COATED ORAL EVERY 6 HOURS PRN
Qty: 28 TABLET | Refills: 0 | Status: SHIPPED | OUTPATIENT
Start: 2024-12-17

## 2024-12-17 RX ORDER — ONDANSETRON 2 MG/ML
INJECTION INTRAMUSCULAR; INTRAVENOUS PRN
Status: DISCONTINUED | OUTPATIENT
Start: 2024-12-17 | End: 2024-12-17

## 2024-12-17 RX ORDER — GLYCOPYRROLATE 0.2 MG/ML
INJECTION, SOLUTION INTRAMUSCULAR; INTRAVENOUS PRN
Status: DISCONTINUED | OUTPATIENT
Start: 2024-12-17 | End: 2024-12-17

## 2024-12-17 RX ORDER — CEFAZOLIN SODIUM/WATER 2 G/20 ML
2 SYRINGE (ML) INTRAVENOUS SEE ADMIN INSTRUCTIONS
Status: DISCONTINUED | OUTPATIENT
Start: 2024-12-17 | End: 2024-12-17 | Stop reason: HOSPADM

## 2024-12-17 RX ORDER — DEXAMETHASONE SODIUM PHOSPHATE 4 MG/ML
INJECTION, SOLUTION INTRA-ARTICULAR; INTRALESIONAL; INTRAMUSCULAR; INTRAVENOUS; SOFT TISSUE PRN
Status: DISCONTINUED | OUTPATIENT
Start: 2024-12-17 | End: 2024-12-17

## 2024-12-17 RX ORDER — IBUPROFEN 800 MG/1
800 TABLET, FILM COATED ORAL EVERY 6 HOURS
Status: DISCONTINUED | OUTPATIENT
Start: 2024-12-17 | End: 2024-12-17 | Stop reason: DRUGHIGH

## 2024-12-17 RX ORDER — OXYCODONE HYDROCHLORIDE 5 MG/1
10 TABLET ORAL EVERY 4 HOURS PRN
Status: DISCONTINUED | OUTPATIENT
Start: 2024-12-17 | End: 2024-12-18 | Stop reason: HOSPADM

## 2024-12-17 RX ORDER — HYDROXYZINE HYDROCHLORIDE 50 MG/1
50 TABLET, FILM COATED ORAL EVERY 6 HOURS PRN
Status: DISCONTINUED | OUTPATIENT
Start: 2024-12-17 | End: 2024-12-17 | Stop reason: HOSPADM

## 2024-12-17 RX ORDER — DEXTROSE MONOHYDRATE 25 G/50ML
25-50 INJECTION, SOLUTION INTRAVENOUS
Status: DISCONTINUED | OUTPATIENT
Start: 2024-12-17 | End: 2024-12-18 | Stop reason: HOSPADM

## 2024-12-17 RX ORDER — LIDOCAINE HYDROCHLORIDE 20 MG/ML
INJECTION, SOLUTION INFILTRATION; PERINEURAL PRN
Status: DISCONTINUED | OUTPATIENT
Start: 2024-12-17 | End: 2024-12-17

## 2024-12-17 RX ORDER — OXYCODONE HYDROCHLORIDE 5 MG/1
5 TABLET ORAL EVERY 4 HOURS PRN
Status: DISCONTINUED | OUTPATIENT
Start: 2024-12-17 | End: 2024-12-18 | Stop reason: HOSPADM

## 2024-12-17 RX ORDER — ACETAMINOPHEN 325 MG/1
975 TABLET ORAL EVERY 6 HOURS
Status: DISCONTINUED | OUTPATIENT
Start: 2024-12-17 | End: 2024-12-18 | Stop reason: HOSPADM

## 2024-12-17 RX ORDER — NALOXONE HYDROCHLORIDE 0.4 MG/ML
0.1 INJECTION, SOLUTION INTRAMUSCULAR; INTRAVENOUS; SUBCUTANEOUS
Status: DISCONTINUED | OUTPATIENT
Start: 2024-12-17 | End: 2024-12-17 | Stop reason: HOSPADM

## 2024-12-17 RX ORDER — ACETAMINOPHEN 325 MG/1
650 TABLET ORAL EVERY 6 HOURS PRN
Qty: 56 TABLET | Refills: 0 | Status: SHIPPED | OUTPATIENT
Start: 2024-12-17

## 2024-12-17 RX ORDER — ONDANSETRON 4 MG/1
4 TABLET, ORALLY DISINTEGRATING ORAL EVERY 30 MIN PRN
Status: DISCONTINUED | OUTPATIENT
Start: 2024-12-17 | End: 2024-12-18 | Stop reason: HOSPADM

## 2024-12-17 RX ORDER — ACETAMINOPHEN 325 MG/1
975 TABLET ORAL ONCE
Status: DISCONTINUED | OUTPATIENT
Start: 2024-12-17 | End: 2024-12-17

## 2024-12-17 RX ORDER — BISACODYL 10 MG
10 SUPPOSITORY, RECTAL RECTAL DAILY PRN
Status: DISCONTINUED | OUTPATIENT
Start: 2024-12-17 | End: 2024-12-18 | Stop reason: HOSPADM

## 2024-12-17 RX ORDER — HYDROMORPHONE HCL IN WATER/PF 6 MG/30 ML
0.4 PATIENT CONTROLLED ANALGESIA SYRINGE INTRAVENOUS
Status: DISCONTINUED | OUTPATIENT
Start: 2024-12-17 | End: 2024-12-18 | Stop reason: HOSPADM

## 2024-12-17 RX ORDER — LIDOCAINE 40 MG/G
CREAM TOPICAL
Status: DISCONTINUED | OUTPATIENT
Start: 2024-12-17 | End: 2024-12-18 | Stop reason: HOSPADM

## 2024-12-17 RX ORDER — OXYCODONE HYDROCHLORIDE 5 MG/1
5 TABLET ORAL EVERY 6 HOURS PRN
Qty: 10 TABLET | Refills: 0 | Status: SHIPPED | OUTPATIENT
Start: 2024-12-17 | End: 2024-12-23

## 2024-12-17 RX ORDER — KETOROLAC TROMETHAMINE 15 MG/ML
15 INJECTION, SOLUTION INTRAMUSCULAR; INTRAVENOUS EVERY 6 HOURS
Status: DISCONTINUED | OUTPATIENT
Start: 2024-12-17 | End: 2024-12-17 | Stop reason: DRUGHIGH

## 2024-12-17 RX ORDER — GABAPENTIN 300 MG/1
300 CAPSULE ORAL
Status: COMPLETED | OUTPATIENT
Start: 2024-12-17 | End: 2024-12-17

## 2024-12-17 RX ORDER — SODIUM PHOSPHATE,MONO-DIBASIC 19G-7G/118
1 ENEMA (ML) RECTAL
Status: DISCONTINUED | OUTPATIENT
Start: 2024-12-17 | End: 2024-12-18 | Stop reason: HOSPADM

## 2024-12-17 RX ORDER — FENTANYL CITRATE 50 UG/ML
25 INJECTION, SOLUTION INTRAMUSCULAR; INTRAVENOUS EVERY 5 MIN PRN
Status: DISCONTINUED | OUTPATIENT
Start: 2024-12-17 | End: 2024-12-17 | Stop reason: HOSPADM

## 2024-12-17 RX ORDER — NALOXONE HYDROCHLORIDE 0.4 MG/ML
0.1 INJECTION, SOLUTION INTRAMUSCULAR; INTRAVENOUS; SUBCUTANEOUS
Status: DISCONTINUED | OUTPATIENT
Start: 2024-12-17 | End: 2024-12-17

## 2024-12-17 RX ORDER — MEPERIDINE HYDROCHLORIDE 25 MG/ML
12.5 INJECTION INTRAMUSCULAR; INTRAVENOUS; SUBCUTANEOUS EVERY 5 MIN PRN
Status: DISCONTINUED | OUTPATIENT
Start: 2024-12-17 | End: 2024-12-17 | Stop reason: HOSPADM

## 2024-12-17 RX ORDER — SODIUM CHLORIDE, SODIUM LACTATE, POTASSIUM CHLORIDE, CALCIUM CHLORIDE 600; 310; 30; 20 MG/100ML; MG/100ML; MG/100ML; MG/100ML
INJECTION, SOLUTION INTRAVENOUS CONTINUOUS
Status: DISCONTINUED | OUTPATIENT
Start: 2024-12-17 | End: 2024-12-17 | Stop reason: HOSPADM

## 2024-12-17 RX ORDER — HYDROXYZINE HYDROCHLORIDE 25 MG/1
25 TABLET, FILM COATED ORAL EVERY 6 HOURS PRN
Status: DISCONTINUED | OUTPATIENT
Start: 2024-12-17 | End: 2024-12-18 | Stop reason: HOSPADM

## 2024-12-17 RX ORDER — FENTANYL CITRATE 50 UG/ML
INJECTION, SOLUTION INTRAMUSCULAR; INTRAVENOUS PRN
Status: DISCONTINUED | OUTPATIENT
Start: 2024-12-17 | End: 2024-12-17

## 2024-12-17 RX ORDER — HYDROXYZINE HYDROCHLORIDE 25 MG/1
25 TABLET, FILM COATED ORAL EVERY 6 HOURS PRN
Status: DISCONTINUED | OUTPATIENT
Start: 2024-12-17 | End: 2024-12-17 | Stop reason: HOSPADM

## 2024-12-17 RX ORDER — PROPOFOL 10 MG/ML
INJECTION, EMULSION INTRAVENOUS CONTINUOUS PRN
Status: DISCONTINUED | OUTPATIENT
Start: 2024-12-17 | End: 2024-12-17

## 2024-12-17 RX ORDER — ONDANSETRON 2 MG/ML
4 INJECTION INTRAMUSCULAR; INTRAVENOUS EVERY 30 MIN PRN
Status: DISCONTINUED | OUTPATIENT
Start: 2024-12-17 | End: 2024-12-17 | Stop reason: HOSPADM

## 2024-12-17 RX ORDER — BUPIVACAINE HYDROCHLORIDE AND EPINEPHRINE 5; 5 MG/ML; UG/ML
INJECTION, SOLUTION PERINEURAL PRN
Status: DISCONTINUED | OUTPATIENT
Start: 2024-12-17 | End: 2024-12-17 | Stop reason: HOSPADM

## 2024-12-17 RX ORDER — FENTANYL CITRATE 50 UG/ML
50 INJECTION, SOLUTION INTRAMUSCULAR; INTRAVENOUS EVERY 5 MIN PRN
Status: DISCONTINUED | OUTPATIENT
Start: 2024-12-17 | End: 2024-12-17 | Stop reason: HOSPADM

## 2024-12-17 RX ADMIN — KETOROLAC TROMETHAMINE 15 MG: 30 INJECTION, SOLUTION INTRAMUSCULAR at 12:34

## 2024-12-17 RX ADMIN — MIDAZOLAM 2 MG: 1 INJECTION INTRAMUSCULAR; INTRAVENOUS at 10:19

## 2024-12-17 RX ADMIN — PHENAZOPYRIDINE 200 MG: 200 TABLET ORAL at 09:37

## 2024-12-17 RX ADMIN — ROCURONIUM BROMIDE 20 MG: 50 INJECTION, SOLUTION INTRAVENOUS at 12:11

## 2024-12-17 RX ADMIN — ONDANSETRON 4 MG: 2 INJECTION INTRAMUSCULAR; INTRAVENOUS at 12:34

## 2024-12-17 RX ADMIN — OXYCODONE 5 MG: 5 TABLET ORAL at 22:38

## 2024-12-17 RX ADMIN — Medication 200 MG: at 12:34

## 2024-12-17 RX ADMIN — HYDROMORPHONE HYDROCHLORIDE 1 MG: 1 INJECTION, SOLUTION INTRAMUSCULAR; INTRAVENOUS; SUBCUTANEOUS at 10:45

## 2024-12-17 RX ADMIN — ROCURONIUM BROMIDE 50 MG: 50 INJECTION, SOLUTION INTRAVENOUS at 10:25

## 2024-12-17 RX ADMIN — DIMENHYDRINATE 25 MG: 50 INJECTION, SOLUTION INTRAMUSCULAR; INTRAVENOUS at 10:37

## 2024-12-17 RX ADMIN — GLYCOPYRROLATE 0.1 MG: 0.2 INJECTION, SOLUTION INTRAMUSCULAR; INTRAVENOUS at 10:25

## 2024-12-17 RX ADMIN — METRONIDAZOLE 500 MG: 500 INJECTION, SOLUTION INTRAVENOUS at 09:48

## 2024-12-17 RX ADMIN — LIDOCAINE HYDROCHLORIDE 100 MG: 20 INJECTION, SOLUTION INFILTRATION; PERINEURAL at 12:09

## 2024-12-17 RX ADMIN — FENTANYL CITRATE 100 MCG: 50 INJECTION INTRAMUSCULAR; INTRAVENOUS at 10:25

## 2024-12-17 RX ADMIN — INSULIN HUMAN 36 UNITS: 100 INJECTION, SUSPENSION SUBCUTANEOUS at 20:51

## 2024-12-17 RX ADMIN — LIDOCAINE HYDROCHLORIDE 100 MG: 20 INJECTION, SOLUTION INFILTRATION; PERINEURAL at 10:25

## 2024-12-17 RX ADMIN — ACETAMINOPHEN 975 MG: 325 TABLET ORAL at 09:37

## 2024-12-17 RX ADMIN — LIDOCAINE HYDROCHLORIDE 0.1 ML: 10 INJECTION, SOLUTION EPIDURAL; INFILTRATION; INTRACAUDAL; PERINEURAL at 09:48

## 2024-12-17 RX ADMIN — ACETAMINOPHEN 975 MG: 325 TABLET ORAL at 19:57

## 2024-12-17 RX ADMIN — MAGNESIUM SULFATE HEPTAHYDRATE 2 G: 40 INJECTION, SOLUTION INTRAVENOUS at 10:34

## 2024-12-17 RX ADMIN — PROPOFOL 200 MCG/KG/MIN: 10 INJECTION, EMULSION INTRAVENOUS at 10:25

## 2024-12-17 RX ADMIN — SENNOSIDES AND DOCUSATE SODIUM 1 TABLET: 8.6; 5 TABLET ORAL at 19:58

## 2024-12-17 RX ADMIN — PROPOFOL 200 MG: 10 INJECTION, EMULSION INTRAVENOUS at 10:25

## 2024-12-17 RX ADMIN — GABAPENTIN 300 MG: 300 CAPSULE ORAL at 09:37

## 2024-12-17 RX ADMIN — Medication 2 G: at 10:15

## 2024-12-17 RX ADMIN — INSULIN ASPART 1 UNITS: 100 INJECTION, SOLUTION INTRAVENOUS; SUBCUTANEOUS at 14:50

## 2024-12-17 RX ADMIN — DEXAMETHASONE SODIUM PHOSPHATE 10 MG: 4 INJECTION, SOLUTION INTRA-ARTICULAR; INTRALESIONAL; INTRAMUSCULAR; INTRAVENOUS; SOFT TISSUE at 10:25

## 2024-12-17 RX ADMIN — SODIUM CHLORIDE, POTASSIUM CHLORIDE, SODIUM LACTATE AND CALCIUM CHLORIDE: 600; 310; 30; 20 INJECTION, SOLUTION INTRAVENOUS at 09:47

## 2024-12-17 ASSESSMENT — ACTIVITIES OF DAILY LIVING (ADL)
ADLS_ACUITY_SCORE: 37
ADLS_ACUITY_SCORE: 35
ADLS_ACUITY_SCORE: 20
ADLS_ACUITY_SCORE: 35
ADLS_ACUITY_SCORE: 35
ADLS_ACUITY_SCORE: 20
ADLS_ACUITY_SCORE: 20
ADLS_ACUITY_SCORE: 37
ADLS_ACUITY_SCORE: 35
ADLS_ACUITY_SCORE: 20
ADLS_ACUITY_SCORE: 35
ADLS_ACUITY_SCORE: 20
ADLS_ACUITY_SCORE: 20

## 2024-12-17 NOTE — ANESTHESIA PROCEDURE NOTES
Airway       Patient location during procedure: OR       Procedure Start/Stop Times: 12/17/2024 10:28 AM  Staff -        CRNA: Anne Livingston APRN CRNA       Performed By: CRNAIndications and Patient Condition       Indications for airway management: rachele-procedural and airway protection       Induction type:intravenous       Mask difficulty assessment: 1 - vent by mask    Final Airway Details       Final airway type: endotracheal airway       Successful airway: ETT - single  Endotracheal Airway Details        ETT size (mm): 7.0       Cuffed: yes       Successful intubation technique: video laryngoscopy       VL Blade Size: Orellana 3       Grade View of Cords: 1       Adjucts: stylet       Position: Right       Measured from: gums/teeth       Secured at (cm): 22       Bite block used: None    Post intubation assessment        Placement verified by: capnometry, equal breath sounds and chest rise        Number of attempts at approach: 1       Number of other approaches attempted: 0       Secured with: tape       Ease of procedure: easy       Dentition: Intact and Unchanged    Medication(s) Administered   Medication Administration Time: 12/17/2024 10:28 AM

## 2024-12-17 NOTE — OP NOTE
"[facility]  Name: Emilie Kruse   MRN: [id number]   : 1975   Procedure date: 24     Pre-operative diagnosis: Abnormal uterine bleeding  Post-operative diagnosis: S/p below procedure    Procedure: Robotic assisted laparoscopic , TOTAL hysterectomy, bilateral salpingo-oophorectomy, bladder dome injury repair, cystoscopy  Anesthesia: general endotracheal intubation    Surgeon: Jelena Bowles MD   Assistant(s): Dr. Ynes Contreras. AMERICA Grover. These assistance were required due to the need for additional skilled surgical hands to retract and hold instruments due to the nature of the surgical procedure and risk of complications.     Indications: Emilie Kruse, 49 year old , presents for abnormal uterine bleeding (irregular menses with ongoing spotting), which lead to iron deficiency anemia.   Patient had \"Hysteroscopy,  D&C with Truclear device\" with Dr. Karla Hanks on 2024 and surgical pathology returned with possible endometrial polyp. Repeat pelvic ultrasound on 10/20/2024 showed heterogeneous endometrial stripe at 21mm, which is concerning for endometrial polyp again. Patient desired definitive surgical management. Thus, above procedure was recommended. Patient has a strong family history of ovarian cancer and she desires bilateral oophorectomy for ovarian cancer prophylaxis.    Estimated blood loss: 10mL  Total IV fluids: 800 mL, Lactated Ringer  Total urine output: 450mL, clear urine  Drains: Richardson catheter, MARIA VICTORIA drain from LLQ incision  Specimens:   ID Type Source Tests Collected by Time Destination   1 :  Tissue Uterus, Bilateral Fallopian Tubes & Ovaries SURGICAL PATHOLOGY EXAM Jelena Bowles MD 2024 10:51 AM      Findings:   -Exam under anesthesia: normal appearing external genitalia without lesions or rash. Normal appearing vaginal canal and cervix.  -Laparoscopy: initial umbilicus trochar entry site was noted to be without evidence of entry " site injury. Normal appearing liver, stomach, diaphragm, peritoneal surfaces, bowel, uterus, bilateral fallopian tubes, and bilateral ovaries. Adhesions between the bladder to the anterior lower uterine segment noted. Incidental bladder dome injury that was repaired. The bladder was back filled with 200mL sterile water with no leakage noted at the injury site  -Cystoscopy: Bladder dome injury, ~1.5cm in length, that was repaired and appeared to be airtight. Injury site was far away from ureteral orifices. A single strand of loose suture noted floating in the bladder, which was removed. Bilateral efflux visualized from UV junction.     Procedural details:  The patient was taken to the operating room where general endotracheal anaesthesia was induced without difficulty. She was prepped and draped in the usual sterile fashion in the dorsal lithotomy position with yellow fin foot holders. IV ancef 2g and flagyl 500mg were given.     A bivalve speculum was placed in the vagina and the anterior lip of the cervix was grasped with a single tooth tenaculum.  A suture was placed in the posterior and anterior lip of the cervix.  The uterus sounded to 6 cm and a 3.5 cm uterine manipulator with Koh cup and balloon occluder were advanced into the cervix for a means to manipulate the uterus.  A dawn catheter was then placed.     Attention was then turned to the abdomen.  All ports were infiltrated with 1% lidocaine with epinephrine prior to making the incision and all ports were 8 mm ports. An 8 mm port was placed at the superior edge of umbilicus under direct visualization with the opti-view port.  Placement was confirmed with the camera. The abdomen was  insufflated. Initial umbilical trochar entry site was noted to be without entry site injury. Three operative port sites were placed under direct visualization: one port was placed 10cm lateral and 2cm inferior to the umbilicus on both the RLQ and LLQ; the 3rd operative port  site was placed on the left side about 1cm superior and 5cm medial to the first LLQ operative port site. The abdomen was inspected and the above findings were noted. Pelvic washing was obtained. The robot was docked.     Right ureter was visualized peristalsing normally in its usual course and left ureter was not visualized due to physiologic adhesions of sigmoid colon to the pelvic side wall. Bilateral oophorectomy was performed by serially clamping, cauterization, and ligation of the IP ligament while leaving bilateral adnexa attached to the uterine cornua. The round ligaments were taken down with the vessel sealer.  The bladder flap was created with the vessel sealer and blunt dissection with the force bipolar grasper. At this time, 1.5cm long bladder dome injury was noted where I was able to see the dawn catheter bulb. The uterine vessels were skeletonized bilaterally. The uterine vessels were taken down with the vessel sealer and carried down hugging the cervix. Intraoperative urology consult was obtained. Two layer bladder dome repaired was performed with 2-0 V-Lock suture (90 day absorbable) in a running fashion for both layer with first layer incorporating the bladder mucosal layer and second layer imbricating the original layer. The bladder was back filled with 200mL sterile water with no leakage noted.     The vaginal incision was made on the Koh cup with the scissors on cutting current.  The uterus and bilateral adnexa were exteriorized through the vagina.  The vaginal cuff was closed with 0 stratofix in a running fashion.  The pelvis was inspected for hemostasis. Right ureter was visualized peristalsing normally after the procedure.  The abdomen was irrigated and hemostasis was assured. Surgicel powder was applied over the bladder. A 7mm round MARIA VICTORIA drain was left in the LLQ under direct visualization. The robot was undocked. The ports were removed under direct visualization. The abdomen was exsufflated.   The LLQ lower incision was closed with 2-0 Ethibond suture while securing the MARIA VICTORIA drain. All other skin incisions were closed with 4-0 Monocryl with a subcuticular stitch and then covered skin glue.     Cystoscopy was performed. Repaired bladder dome injury, ~1.5cm in length, appeared to be airtight. Injury site was far away from ureteral orifices. A single strand of loose suture noted floating in the bladder, which was removed. Bilateral efflux visualized from UV junction. Visualization continued upon removal of the cystoscope with no abnormalities of the urethra identified.    Both surgeons actively first assisted during this surgery to provide nessessary retraction and exposure as well as maintaining hemostasis and a clear operative field. This was helpful in allowing the operative to proceed in a safe and expeditious manner. Both surgeons were present for the entire duration of the surgery.     Complications: None apparent   Condition: Patient taken to recovery in stable condition.    Jelena Bowles MD  Obstetrics and Gynecology  12/17/2024

## 2024-12-17 NOTE — DISCHARGE INSTRUCTIONS
Same Day Surgery Discharge Instructions  Special Precautions After Surgery - Adult    It is not unusual to feel lightheaded or faint, up to 24 hours after surgery or while taking pain medication.  If you have these symptoms; sit for a few minutes before standing and have someone assist you when getting up.  You should rest and relax for the next 24 hours and must have someone stay with you for at least 24 hours after your discharge.  DO NOT DRIVE any vehicle or operate mechanical equipment for 24 hours following the end of your surgery.  DO NOT DRIVE while taking narcotic pain medications that have been prescribed by your physician.  If you had a limb operated on, you must be able to use it fully to drive.  DO NOT drink alcoholic beverages for 24 hours following surgery or while taking prescription pain medication.  Drink clear liquids (apple juice, ginger ale, broth, 7-Up, etc.).  Progress to your regular diet as you feel able.  Any questions call your physician and do not make important decisions for 24 hours.    Nausea and Vomiting: Nausea and vomiting can occur any time after receiving anesthesia. If you experience nausea and vomiting we encourage you to move to a clear liquid diet and advance your diet as tolerated. If nausea and vomiting do not improve within 12 hours please call the surgeon or present to the Emergency department.     Break-through Bleeding: If your experience bleeding from your surgical site apply pressure and additional dressing per nurse instruction. For simple problems such as a saturated dressing, you may need to reinforce the dressing with more gauze and tape and put slight pressure on the site. If bleeding does not subside contact the surgeon or present to the Emergency Department.    Post-op Infection: If you develop a fever of 100.4 or greater, have pus like drainage, redness, swelling or severe pain at the surgical site not alleviated with pain medications; please  contact the surgeon or present to the Emergency Department.     Medications:  Acetaminophen (Tylenol):  975 MG given at 09:35 AM, Next dose: 1:30 PM.  Ibuprofen (Motrin, Advil): 600 MG,  Next dose: anytime.  Oxycodone 5 MG:  Next dose: 5:30 pm.  Senna stool softener  Miralax laxative   Follow the instructions on the bottle.  __________________________________________________________________________________________________________________________________  IMPORTANT NUMBERS:    [facility] Main Number:  [phone], [phone]  Pharmacy:  [phone]  Same Day Surgery:  [phone], for general post-op questions call Monday - Thursday until 8:30 p.m., Fridays until 6:00 p.m.   Mental Health Mobile Crisis line: [phone]                                                                      OB Clinic:  [phone]   Specialty Access Center: [phone]

## 2024-12-17 NOTE — PLAN OF CARE
"[facility] ADMISSION NOTE    Patient admitted to room 2043 at approximately 1500 via cart from surgery. Patient was accompanied by significant other.       Patient trasferred to bed via air sasha. Patient alert and oriented X 3. Pain is controlled with current analgesics.  Admission vital signs: Blood pressure 135/72, pulse 92, temperature 98.5  F (36.9  C), temperature source Temporal, resp. rate 16, height 1.613 m (5' 3.5\"), weight 95.3 kg (210 lb), last menstrual period 09/08/2024, SpO2 95%. Patient was oriented to plan of care, call light, bed controls, tv, telephone, bathroom, and visiting hours.     Risk Assessment    The following safety risks were identified during admission: fall. Yellow risk band applied: YES.     Skin Initial Assessment    This writer admitted this patient and completed a full skin assessment and Pito score in the Adult PCS flowsheet.   Photo documentation of skin problem and/or wound competed via Thrill application (located under Media):  N/A    Appropriate interventions initiated as needed.     Secondary skin check completed by Sherley Mckeon RN.    Pito Risk Assessment  Sensory Perception: 3-->slightly limited  Moisture: 3-->occasionally moist  Activity: 1-->bedfast  Mobility: 2-->very limited  Nutrition: 2-->probably inadequate  Friction and Shear: 2-->potential problem  Pito Score: 13    Education    Patient has a [place] to Observation order: Yes  Observation education completed and documented: Yes      Lesvia Schreiber RN      " Severe Acute on Chronic Malnutrition

## 2024-12-17 NOTE — ANESTHESIA POSTPROCEDURE EVALUATION
Patient: Emilie Kruse    Procedure: Procedure(s):  HYSTERECTOMY, ROBOT-ASSISTED, LAPAROSCOPIC, USING DA AIMEE XI; with bladder repair bilateral salpingo-oophorectomy, possible cystoscopy       Anesthesia Type:  General    Note:  Disposition: Admission   Postop Pain Control: Uneventful            Sign Out: Well controlled pain   PONV: No   Neuro/Psych: Uneventful            Sign Out: Acceptable/Baseline neuro status   Airway/Respiratory: Uneventful            Sign Out: Acceptable/Baseline resp. status   CV/Hemodynamics: Uneventful            Sign Out: Acceptable CV status; No obvious hypovolemia; No obvious fluid overload   Other NRE: NONE   DID A NON-ROUTINE EVENT OCCUR? No           Last vitals:  Vitals Value Taken Time   /82 12/17/24 1415   Temp 36.9  C (98.5  F) 12/17/24 1415   Pulse 89 12/17/24 1419   Resp 10 12/17/24 1419   SpO2 98 % 12/17/24 1419   Vitals shown include unfiled device data.    Electronically Signed By: DEVAN Armstrong CRNA  December 17, 2024  2:21 PM

## 2024-12-17 NOTE — PROGRESS NOTES
Urology Note    I was called intra-op to assess bladder dome injury. Small perhaps 1-2 cm bladder dome laceration. Discussed repair would be with 2 layer, recommend 2-0 V-loc and leak test. Dr Bowles repaired. Leak tested with 200 ml and negative for leak. I recommended MARIA VICTORIA drain overnight till tomorrow and drain Cr in morning if that's normal drain can be removed. Recommend XR cystogram in 1-2 weeks before dawn removal.     Jenni Gomes MD

## 2024-12-18 VITALS
SYSTOLIC BLOOD PRESSURE: 121 MMHG | HEIGHT: 64 IN | TEMPERATURE: 98.8 F | HEART RATE: 85 BPM | BODY MASS INDEX: 35.85 KG/M2 | RESPIRATION RATE: 16 BRPM | OXYGEN SATURATION: 98 % | DIASTOLIC BLOOD PRESSURE: 63 MMHG | WEIGHT: 210 LBS

## 2024-12-18 DIAGNOSIS — S37.20XA INJURY OF BLADDER, INITIAL ENCOUNTER: Primary | ICD-10-CM

## 2024-12-18 LAB
ANION GAP SERPL CALCULATED.3IONS-SCNC: 10 MMOL/L (ref 7–15)
BUN SERPL-MCNC: 20.7 MG/DL (ref 6–20)
CALCIUM SERPL-MCNC: 9.1 MG/DL (ref 8.8–10.4)
CHLORIDE SERPL-SCNC: 103 MMOL/L (ref 98–107)
CREAT FLD-MCNC: 1 MG/DL
CREAT SERPL-MCNC: 0.74 MG/DL (ref 0.51–0.95)
CREATININE BODY FLUID SOURCE: NORMAL
EGFRCR SERPLBLD CKD-EPI 2021: >90 ML/MIN/1.73M2
GLUCOSE BLDC GLUCOMTR-MCNC: 177 MG/DL (ref 70–99)
GLUCOSE BLDC GLUCOMTR-MCNC: 282 MG/DL (ref 70–99)
GLUCOSE SERPL-MCNC: 176 MG/DL (ref 70–99)
HCO3 SERPL-SCNC: 23 MMOL/L (ref 22–29)
HOLD SPECIMEN: NORMAL
POTASSIUM SERPL-SCNC: 4.5 MMOL/L (ref 3.4–5.3)
SODIUM SERPL-SCNC: 136 MMOL/L (ref 135–145)

## 2024-12-18 PROCEDURE — 250N000013 HC RX MED GY IP 250 OP 250 PS 637: Performed by: STUDENT IN AN ORGANIZED HEALTH CARE EDUCATION/TRAINING PROGRAM

## 2024-12-18 PROCEDURE — 36415 COLL VENOUS BLD VENIPUNCTURE: CPT | Performed by: STUDENT IN AN ORGANIZED HEALTH CARE EDUCATION/TRAINING PROGRAM

## 2024-12-18 PROCEDURE — 82570 ASSAY OF URINE CREATININE: CPT | Performed by: STUDENT IN AN ORGANIZED HEALTH CARE EDUCATION/TRAINING PROGRAM

## 2024-12-18 PROCEDURE — 82374 ASSAY BLOOD CARBON DIOXIDE: CPT | Performed by: STUDENT IN AN ORGANIZED HEALTH CARE EDUCATION/TRAINING PROGRAM

## 2024-12-18 PROCEDURE — 84295 ASSAY OF SERUM SODIUM: CPT | Performed by: STUDENT IN AN ORGANIZED HEALTH CARE EDUCATION/TRAINING PROGRAM

## 2024-12-18 PROCEDURE — 80048 BASIC METABOLIC PNL TOTAL CA: CPT | Performed by: STUDENT IN AN ORGANIZED HEALTH CARE EDUCATION/TRAINING PROGRAM

## 2024-12-18 PROCEDURE — 82962 GLUCOSE BLOOD TEST: CPT

## 2024-12-18 RX ORDER — OXYBUTYNIN CHLORIDE 5 MG/1
5 TABLET, EXTENDED RELEASE ORAL DAILY
Qty: 14 TABLET | Refills: 0 | Status: SHIPPED | OUTPATIENT
Start: 2024-12-18

## 2024-12-18 RX ADMIN — OXYCODONE 5 MG: 5 TABLET ORAL at 11:23

## 2024-12-18 RX ADMIN — INSULIN HUMAN 36 UNITS: 100 INJECTION, SUSPENSION SUBCUTANEOUS at 08:04

## 2024-12-18 RX ADMIN — ACETAMINOPHEN 975 MG: 325 TABLET ORAL at 02:18

## 2024-12-18 RX ADMIN — ACETAMINOPHEN 975 MG: 325 TABLET ORAL at 07:37

## 2024-12-18 RX ADMIN — SENNOSIDES AND DOCUSATE SODIUM 1 TABLET: 8.6; 5 TABLET ORAL at 07:37

## 2024-12-18 RX ADMIN — OXYCODONE 5 MG: 5 TABLET ORAL at 07:38

## 2024-12-18 ASSESSMENT — ACTIVITIES OF DAILY LIVING (ADL)
ADLS_ACUITY_SCORE: 20
ADLS_ACUITY_SCORE: 21
ADLS_ACUITY_SCORE: 20
ADLS_ACUITY_SCORE: 21
ADLS_ACUITY_SCORE: 20
ADLS_ACUITY_SCORE: 21
ADLS_ACUITY_SCORE: 20
ADLS_ACUITY_SCORE: 20
ADLS_ACUITY_SCORE: 21
ADLS_ACUITY_SCORE: 20
ADLS_ACUITY_SCORE: 20

## 2024-12-18 NOTE — PLAN OF CARE
Goal Outcome Evaluation:      Plan of Care Reviewed With: patient    Overall Patient Progress: improvingOverall Patient Progress: improving     Vitals WNL. Pain controlled with prn oxycodone and scheduled tylenol. MARIA VICTORIA and dawn remain in. MD at bedside this morning discussing plan for the day. Plan is to wait for send out lab of MARIA VICTORIA drain fluid; results should be in by this afternoon. Pt tolerated breakfast. Ambulated halls in OB without difficulty. Significant other at bedside and supportive. Education started about dawn cares at home. Plan is for patient to have dawn for two weeks.

## 2024-12-18 NOTE — PROGRESS NOTES
[facility]  Gynecology Progress Note  S:   0800: RLQ pain is more notable. Tolerated full liquid diet without n/v. Ambulated with slight lightheadedness.  1110: Minimal abdominal/pelvic pain. Tolerated regular diet without n/v. Ambulated without difficulties.    O:  Patient Vitals for the past 24 hrs:   BP Temp Temp src Pulse Resp SpO2   12/18/24 1107 121/63 98.8  F (37.1  C) Oral -- -- 98 %   12/18/24 0728 -- 98.8  F (37.1  C) Oral 85 16 98 %   12/18/24 0636 136/74 97.9  F (36.6  C) Oral 73 16 98 %   12/18/24 0221 134/72 97.9  F (36.6  C) Oral 79 16 96 %   12/17/24 2245 (!) 142/74 97.7  F (36.5  C) Oral 82 16 97 %   12/17/24 1949 (!) 148/77 98.5  F (36.9  C) Oral -- 16 94 %   12/17/24 1800 135/72 -- -- 96 16 95 %   12/17/24 1700 (!) 151/78 -- -- 93 16 95 %   12/17/24 1600 135/72 -- -- 92 16 95 %   12/17/24 1552 (!) 147/83 -- -- 94 -- 96 %   12/17/24 1530 (!) 141/77 -- -- 100 -- 97 %   12/17/24 1515 (!) 145/78 -- -- 97 -- 96 %   12/17/24 1453 -- -- -- -- -- 97 %   12/17/24 1445 (!) 148/80 -- -- 86 14 97 %   12/17/24 1430 (!) 145/85 -- -- 104 18 96 %   12/17/24 1415 (!) 144/82 98.5  F (36.9  C) Temporal 89 13 97 %   12/17/24 1400 (!) 148/86 -- -- 86 12 97 %   12/17/24 1345 139/80 -- -- 87 12 100 %   12/17/24 1330 139/81 -- -- 95 11 99 %   12/17/24 1315 130/74 -- -- 90 10 99 %   12/17/24 1313 129/72 98  F (36.7  C) Temporal 92 11 97 %     Gen: Resting comfortably, NAD  CV: Well perfused  Pulm: Normal respiratory efforts.  Abd: Soft, appropriately ttp, non-distended. 4 Laparoscopic incision sites are healing well. The MARIA VICTORIA drain was removed from the LLQ and this laparoscopic incision was covered with steri strip  Ext: SCDs in place, trace LE edema b/l    Richardson catheter draining clear dark orange urine.  MARIA VICTORIA drain has bloody clear fluid.  Component      Latest Ref Rng 12/18/2024  6:35 AM 12/18/2024  7:59 AM   Sodium      135 - 145 mmol/L 136     Potassium      3.4 - 5.3 mmol/L 4.5     Chloride      98 -  "107 mmol/L 103     Carbon Dioxide (CO2)      22 - 29 mmol/L 23     Anion Gap      7 - 15 mmol/L 10     Urea Nitrogen      6.0 - 20.0 mg/dL 20.7 (H)     Creatinine      0.51 - 0.95 mg/dL 0.74     GFR Estimate      >60 mL/min/1.73m2 >90     Calcium      8.8 - 10.4 mg/dL 9.1     Glucose      70 - 99 mg/dL 176 (H)     Creatinine Fluid Source  Drain    Creatinine Fluid      mg/dL  1.0       A/P: Emilie Kruse, 49 year old POD#1 s/p \"Robotic assisted laparoscopic , TOTAL hysterectomy, bilateral salpingo-oophorectomy, bladder dome injury repair, cystoscopy\", is meeting all postop goals. She was kept overnight due to bladder dome injury. Peritoneal fluid from MARIA VICTORIA drain creatine returned within normal range. MARIA VICTORIA drain was removed. Cr returned normal.    Plan to discharge to home today with keflex 500mg daily and Oxybutynin 5mg ER daily till dawn catheter removal. Plan for XR cystogram 1 week after surgery. If no leakage detected by XR cystogram, plan to remove dawn catheter.     Jelena Bowles MD  Obstetrics and Gynecology  [facility]   12/18/2024 11:51 AM        "

## 2024-12-18 NOTE — DISCHARGE SUMMARY
Gynecology Discharge Summary    Emilie Kruse    YOB: 1975  MRN# [id number]   Age: 49 year old         Date of Admission:  12/17/2024  Date of Discharge:  12/18/24   Admitting Physician:  Jelena Bowles MD  Discharge Physician:  Jelena Bowles MD   Discharging Service:  Gynecology     Primary Provider: Wegener, Brita L          Admission Diagnoses:   Type 2 diabetes  Chronic hypertension  Abnormal uterine bleeding          Discharge Diagnosis:   S/p below procedure  Type 2 diabetes  Chronic hypertension            Discharge Disposition:     Discharged to home           Procedures:   Robotic assisted laparoscopic , TOTAL hysterectomy, bilateral salpingo-oophorectomy, bladder dome injury repair, cystoscopy           Medications Prior to Admission:     Medications Prior to Admission   Medication Sig Dispense Refill Last Dose/Taking    insulin NPH (NOVOLIN N VIAL) 100 UNIT/ML vial Inject 36 Units Subcutaneous 2 times daily   12/17/2024 Morning    insulin regular 100 UNIT/ML vial Inject 30 Units Subcutaneous 3 times daily (before meals) 30 units-breakfast  20 units-lunch  32 units-dinner   12/16/2024    metformin (GLUCOPHAGE) 1000 MG tablet Take 1,000 mg by mouth 2 times daily (with meals).   12/16/2024 Morning    LISINOPRIL PO Take  by mouth.   12/15/2024    [DISCONTINUED] ibuprofen (ADVIL/MOTRIN) 600 MG tablet Take 600 mg by mouth every 6 hours as needed for moderate pain   More than a month    [DISCONTINUED] medroxyPROGESTERone (PROVERA) 10 MG tablet Take 1 tablet (10 mg) by mouth daily. Take 20mg (2 tablets) daily for 1 week. Then 10mg daily till surgery 60 tablet 1     [DISCONTINUED] oxyCODONE (ROXICODONE) 5 MG tablet Take 5 mg by mouth every 4 hours as needed for severe pain       [DISCONTINUED] tranexamic acid (LYSTEDA) 650 MG tablet Take 2 tablets (1,300 mg) by mouth 3 times daily. 30 tablet 3            Discharge Medications:        Review of your medicines        START taking        Dose / Directions    acetaminophen 325 MG tablet  Commonly known as: TYLENOL      Dose: 650 mg  Take 2 tablets (650 mg) by mouth every 6 hours as needed for mild pain.  Quantity: 56 tablet  Refills: 0     cephALEXin 500 MG capsule  Commonly known as: KEFLEX  Used for: Injury of bladder, initial encounter      Dose: 500 mg  Take 1 capsule (500 mg) by mouth daily.  Quantity: 14 capsule  Refills: 0     oxyBUTYnin ER 5 MG 24 hr tablet  Commonly known as: DITROPAN XL  Used for: Injury of bladder, initial encounter      Dose: 5 mg  Take 1 tablet (5 mg) by mouth daily.  Quantity: 14 tablet  Refills: 0     polyethylene glycol 17 GM/Dose powder  Commonly known as: MIRALAX      Dose: 1 Capful  Take 17 g (1 Capful) by mouth daily.  Quantity: 527 g  Refills: 0            CONTINUE these medicines which may have CHANGED, or have new prescriptions. If we are uncertain of the size of tablets/capsules you have at home, strength may be listed as something that might have changed.        Dose / Directions   ibuprofen 600 MG tablet  Commonly known as: ADVIL/MOTRIN  This may have changed: reasons to take this      Dose: 600 mg  Take 1 tablet (600 mg) by mouth every 6 hours as needed for other (mild and/or inflammatory pain).  Quantity: 28 tablet  Refills: 0     oxyCODONE 5 MG tablet  Commonly known as: ROXICODONE  This may have changed:   when to take this  reasons to take this      Dose: 5 mg  Take 1 tablet (5 mg) by mouth every 6 hours as needed for moderate to severe pain.  Quantity: 10 tablet  Refills: 0            CONTINUE these medicines which have NOT CHANGED        Dose / Directions   insulin regular 100 UNIT/ML vial      Dose: 30 Units  Inject 30 Units Subcutaneous 3 times daily (before meals) 30 units-breakfast  20 units-lunch  32 units-dinner  Refills: 0     LISINOPRIL PO      Take  by mouth.  Refills: 0     metFORMIN 1000 MG tablet  Commonly known as: GLUCOPHAGE      Dose: 1,000 mg  Take 1,000 mg by mouth 2 times daily (with meals).  Refills:  "0     NovoLIN N VIAL 100 UNIT/ML susp  Generic drug: insulin NPH      Dose: 36 Units  Inject 36 Units Subcutaneous 2 times daily  Refills: 0            STOP taking      medroxyPROGESTERone 10 MG tablet  Commonly known as: PROVERA        tranexamic acid 650 MG tablet  Commonly known as: LYSTEDA                  Where to get your medicines        These medications were sent to [facility] - [place], [place] - [address]  [address], [place] [address]      Phone: [phone]   acetaminophen 325 MG tablet  cephALEXin 500 MG capsule  ibuprofen 600 MG tablet  oxyBUTYnin ER 5 MG 24 hr tablet  oxyCODONE 5 MG tablet  polyethylene glycol 17 GM/Dose powder             Consultations:   Urology       [facility] course    Emilie Kruse, 49 year old , presents for abnormal uterine bleeding (irregular menses with ongoing spotting), which lead to iron deficiency anemia.   Patient had \"Hysteroscopy,  D&C with Truclear device\" with Dr. Karla Hanks on 2024 and surgical pathology returned with possible endometrial polyp. Repeat pelvic ultrasound on 10/20/2024 showed heterogeneous endometrial stripe at 21mm, which is concerning for endometrial polyp again. Patient desired definitive surgical management. Thus, above procedure was recommended. Patient has a strong family history of ovarian cancer and she desires bilateral oophorectomy for ovarian cancer prophylaxis.    Surgery was complicated by bladder dome injury, ~1.5cm in length, that was repaired and appeared to be airtight. Peritoneal MARIA VICTORIA drain from LLQ was removed on POD#1 after peritoneal fluid from MARIA VICTORIA drain creatine returned within normal range. She was discharged home with dawn catheter, keflex 500mg daily and Oxybutynin 5mg ER daily till dawn catheter removal. Plan for XR cystogram 1 week after surgery. If no leakage detected by XR cystogram, plan to remove dawn catheter.     For her T2DM, she was on regular insulin (), " novolin 36 units BID, and metoformin 1g BID prior to arrival. She was kept on Novolog 18 units with dinner and preprandial blood glucose  check with medium sliding scale insulin while here.     For her HTN, her lisinopril was held here.           Discharge Instructions and Follow-Up:     Discharge diet: Regular   Discharge activity: Lifting restricted to 15 pounds  No lifting, driving, or strenuous exercise for 6 week(s)  No sex for 6 week(s)  No driving or operating machinery while on narcotic analgesics  Pelvic rest: abstain from intercourse and do not use tampons for 6 week(s)   Discharge follow-up: Call [facility] to schedule XR cystogram after 12/25.   Return to clinic on 12/31 for dawn removal and postop check.     Jelena Bowles MD  Obstetrics and Gynecology  12/18/2024 12:13 PM

## 2024-12-18 NOTE — PLAN OF CARE
Discharge instructions reviewed with patient, states understanding. Office visit with urology after 12/25.Return to clinic for follow-up appointment on 12/30. Medications and cath care reviewed. Discharged ambulatory to home with  at 1310.

## 2024-12-18 NOTE — PROGRESS NOTES
Patient's NPH insulin label did not match the ordered dose so Writer checked with pharmacy and was given the ok to administer per the MAR.

## 2024-12-18 NOTE — PLAN OF CARE
VSS afebrile. Pt able to sleep most of the night. Ambulated in the hallway 1x. Pain managed with tylenol. Oxy given once for pain. Incision clean dry and intact.

## 2024-12-20 ENCOUNTER — MYC MEDICAL ADVICE (OUTPATIENT)
Dept: OBGYN | Facility: CLINIC | Age: 49
End: 2024-12-20
Payer: COMMERCIAL

## 2024-12-20 DIAGNOSIS — Z90.710 S/P HYSTERECTOMY: Primary | ICD-10-CM

## 2024-12-20 LAB
PATH REPORT.COMMENTS IMP SPEC: NORMAL
PATH REPORT.COMMENTS IMP SPEC: NORMAL
PATH REPORT.FINAL DX SPEC: NORMAL
PATH REPORT.GROSS SPEC: NORMAL
PATH REPORT.MICROSCOPIC SPEC OTHER STN: NORMAL
PATH REPORT.RELEVANT HX SPEC: NORMAL
PHOTO IMAGE: NORMAL

## 2024-12-22 ENCOUNTER — NURSE TRIAGE (OUTPATIENT)
Dept: NURSING | Facility: CLINIC | Age: 49
End: 2024-12-22
Payer: COMMERCIAL

## 2024-12-22 NOTE — TELEPHONE ENCOUNTER
Nurse Triage SBAR    Is this a 2nd Level Triage? NO    Situation: Richardson Catheter questions    Background:   -Hysterectomy on Tues. Kept overnight until Wed. 12/18 Bladder tear, catheter in 2 weeks  -[facility], pending results have catheter removed  -12/30 surgery followup evaluate for if Richardosn able to be removed    Assessment:   -Hx: Diabetic (Insulin dependant and on Metformin)  -Richardson Catheter  -Boyfriend noticed, sit on couch, tubing gets pulled, readjust, looks like extra tubing out  -On Leg, tape with flap to secure  -noticed tubing had become unsecured  -still feels in there balloon,   -still draining urine, dark yellow, no red drainage or bleed, flecks  -Postop pain unchanged  -Afebrile  -Trying to sip and drink  -Lightheaded and Dizziness (no acute change)  -Blood sugars normal    Protocol Recommended Disposition:   Home Care    Recommendation: Care advice reviewed. Patient verbalized understanding and agreed to follow care advice given. Advised to call back with any new signs or symptoms, Patient agreed         Reason for Disposition   Urinary catheter care, questions about    Additional Information   Negative: Shock suspected (e.g., cold/pale/clammy skin, too weak to stand, low BP, rapid pulse)   Negative: Sounds like a life-threatening emergency to the triager   Negative: Urinary catheter and spinal cord injury, questions about   Negative: Urinary catheter in a hospice patient   Negative: [1] Catheter was accidentally pulled-out AND [2] bright red continuous bleeding (or trickling)   Negative: SEVERE abdominal pain   Negative: Fever > 100.4 F (38.0 C)   Negative: [1] Drinking very little AND [2] dehydration suspected (e.g., no urine > 12 hours, very dry mouth, very lightheaded)   Negative: Patient sounds very sick or weak to the triager   Negative: Catheter was accidentally pulled-out   Negative: [1] Catheter is broken AND [2] is not working (does not function normally)   Negative:  Bleeding around catheter (e.g., from penis or female urethra)   Negative: New-onset MILD-MODERATE lower abdominal pain or swelling (distention)   Negative: [1] No urine in bag > 4 hours AND [2] catheter is not kinked   Negative: [1] Pink, slightly red, or tea-colored urine lasts > 24 hours AND [2] not cleared by increased fluid intake AND [3] no recent prostate or bladder surgery   Negative: [1] Cloudy urine lasts > 24 hours AND [2] not cleared by increased fluid intake   Negative: [1] Bloody or red-colored urine AND [2] no recent prostate or bladder surgery  (Exception: Brief episode and urine now clear.)   Negative: [1] Bloody or red-colored urine AND [2] prostate or bladder surgery > 3 days (72 hours) ago   Negative: Foul-smelling urine (urine smells bad)   Negative: [1] Catheter is broken or cracked AND [2] still works (functioning normally)   Negative: Leakage of urine around catheter   Negative: [1] Pink, slightly red, or tea-colored urine AND [2] present < 24 hours   Negative: [1] Cloudy urine AND [2] present < 24 hours   Negative: [1] Prostate surgery or other urologic surgery < 3 days (72 hours) ago AND [2] blood present in urine   Negative: No urine in bag < 4 hours    Protocols used: Urinary Catheter (e.g., Richardson) Symptoms and [id number]  Yessi Azar RN, Triage Nurse Advisor, 12/22/2024 5:32 PM

## 2024-12-23 ENCOUNTER — NURSE TRIAGE (OUTPATIENT)
Dept: NURSING | Facility: CLINIC | Age: 49
End: 2024-12-23
Payer: COMMERCIAL

## 2024-12-23 RX ORDER — OXYCODONE HYDROCHLORIDE 5 MG/1
5 TABLET ORAL EVERY 6 HOURS PRN
Qty: 10 TABLET | Refills: 0 | Status: SHIPPED | OUTPATIENT
Start: 2024-12-23

## 2024-12-24 ENCOUNTER — HOSPITAL ENCOUNTER (EMERGENCY)
Facility: CLINIC | Age: 49
Discharge: HOME | End: 2024-12-24
Attending: EMERGENCY MEDICINE
Payer: COMMERCIAL

## 2024-12-24 VITALS
TEMPERATURE: 98 F | SYSTOLIC BLOOD PRESSURE: 150 MMHG | HEIGHT: 63 IN | RESPIRATION RATE: 18 BRPM | DIASTOLIC BLOOD PRESSURE: 73 MMHG | WEIGHT: 210 LBS | BODY MASS INDEX: 37.21 KG/M2 | HEART RATE: 91 BPM | OXYGEN SATURATION: 99 %

## 2024-12-24 DIAGNOSIS — T83.9XXA FOLEY CATHETER PROBLEM, INITIAL ENCOUNTER (H): ICD-10-CM

## 2024-12-24 PROCEDURE — 99282 EMERGENCY DEPT VISIT SF MDM: CPT | Performed by: EMERGENCY MEDICINE

## 2024-12-24 PROCEDURE — 99283 EMERGENCY DEPT VISIT LOW MDM: CPT | Mod: 25 | Performed by: EMERGENCY MEDICINE

## 2024-12-24 ASSESSMENT — ACTIVITIES OF DAILY LIVING (ADL): ADLS_ACUITY_SCORE: 42

## 2024-12-24 NOTE — ED TRIAGE NOTES
"Presents to triage with concerns of catheter problem.   Pt states she \"can feel the balloon when I sit down\"  Pt has had this feeling since yesterday. Urine still draining into bag.   POD 7 hysterectomy with bladder tear.      Triage Assessment (Adult)       Row Name 12/24/24 0012          Triage Assessment    Airway WDL WDL        Respiratory WDL    Respiratory WDL WDL        Skin Circulation/Temperature WDL    Skin Circulation/Temperature WDL WDL        Cardiac WDL    Cardiac WDL WDL        Peripheral/Neurovascular WDL    Peripheral Neurovascular WDL WDL        Cognitive/Neuro/Behavioral WDL    Cognitive/Neuro/Behavioral WDL WDL                     "

## 2024-12-24 NOTE — DISCHARGE INSTRUCTIONS
Your evaluation emergency department is reassuring.  Your catheter and balloon are within your urinary bladder and functioning appropriately.    Follow up with your care team as planned.     If your symptoms worsen or you develop new or concerning symptoms, please return to the Emergency Department for further evaluation and treatment.

## 2024-12-24 NOTE — TELEPHONE ENCOUNTER
Nurse Triage SBAR    Is this a 2nd Level Triage? NO    Situation:  urinary catheter concerns.    Background: Patient has a dawn catheter in place after hysterectomy last Tuesday. Patient states that she had pulled on the catheter last night and she states that now tonight she can feel it and its causing discomfort. She states that she is still noting urinary output. Denies blood in urine at time of call  Patient states that she felt wet earlier and noted that there was urine. She also noted urinating around the catheter.     Assessment: tonight she can feel it and its causing discomfort. She states that she is still noting urinary output. Denies blood in urine at time of call  Patient states that she felt wet earlier and noted that there was urine. She also noted urinating around the catheter.     Protocol Recommended Disposition:   Call PCP Within 24 Hours    Recommendation:  care advice given. Patient wants this matter addressed tonight and will most likely present to ED.          Does the patient meet one of the following criteria for ADS visit consideration? 16+ years old, with an [facility] PCP     TIP  Providers, please consider if this condition is appropriate for management at one of our Acute and Diagnostic Services sites.     If patient is a good candidate, please use dotphrase <dot>triageresponse and select Refer to ADS to document.      Reason for Disposition   Leakage of urine around catheter    Additional Information   Negative: Shock suspected (e.g., cold/pale/clammy skin, too weak to stand, low BP, rapid pulse)   Negative: Sounds like a life-threatening emergency to the triager   Negative: Urinary catheter and spinal cord injury, questions about   Negative: Urinary catheter in a hospice patient   Negative: [1] Catheter was accidentally pulled-out AND [2] bright red continuous bleeding (or trickling)   Negative: Fever > 100.4 F (38.0 C)   Negative: SEVERE abdominal pain   Negative: [1] Drinking very  little AND [2] dehydration suspected (e.g., no urine > 12 hours, very dry mouth, very lightheaded)   Negative: Patient sounds very sick or weak to the triager   Negative: Catheter was accidentally pulled-out   Negative: [1] Catheter is broken AND [2] is not working (does not function normally)   Negative: Bleeding around catheter (e.g., from penis or female urethra)   Negative: New-onset MILD-MODERATE lower abdominal pain or swelling (distention)   Negative: [1] No urine in bag > 4 hours AND [2] catheter is not kinked   Negative: [1] Pink, slightly red, or tea-colored urine lasts > 24 hours AND [2] not cleared by increased fluid intake AND [3] no recent prostate or bladder surgery   Negative: [1] Cloudy urine lasts > 24 hours AND [2] not cleared by increased fluid intake   Negative: [1] Bloody or red-colored urine AND [2] no recent prostate or bladder surgery  (Exception: Brief episode and urine now clear.)   Negative: [1] Bloody or red-colored urine AND [2] prostate or bladder surgery > 3 days (72 hours) ago   Negative: Foul-smelling urine (urine smells bad)   Negative: [1] Catheter is broken or cracked AND [2] still works (functioning normally)    Protocols used: Urinary Catheter (e.g., Richardson) Symptoms and [id number]

## 2024-12-24 NOTE — ED PROVIDER NOTES
History     Chief Complaint   Patient presents with    Catheter Problem     HPI  Emilie Kruse is a 49 year old female with history notable for robotic assisted hysterectomy complicated by injury of dome of urinary bladder with Richardson catheter and placed.  Had tugging on this yesterday and is concerned that she may have dislodged or displaced her Richardson catheter.  No fevers or chills.  No nausea or vomiting.    The patient's PMHx, Surgical Hx, Allergies, and Medications were all reviewed with the patient.    Allergies:  Allergies   Allergen Reactions    Tramadol Nausea and Nausea and Vomiting       Problem List:    Patient Active Problem List    Diagnosis Date Noted    S/P hysterectomy 12/17/2024     Priority: Medium    Anemia due to blood loss, acute 10/24/2024     Priority: Medium    Mild or unspecified pre-eclampsia, antepartum 04/11/2021     Priority: Medium     Formatting of this note might be different from the original.  past hx of both pregnancies      Spinal stenosis, lumbar region, with neurogenic claudication 04/07/2021     Priority: Medium    Spondylolisthesis 04/07/2021     Priority: Medium    Atypical glandular cells on cervical Pap smear 04/21/2017     Priority: Medium     Formatting of this note might be different from the original.  Colposcopy, endometrial biosy - May 2017 - negative.  Pap smear, HPV testing in 12 months, 24 months.  If abnormal, repeat colposcopy.  Formatting of this note might be different from the original.  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  Diagnosed in 2009. On Metformin and lantus.  No microvascular complications      Endometriosis of ovary 05/25/2012     Priority: Medium     "Endometrioma 2012     Priority: Medium    Family history of ovarian cancer 2012     Priority: Medium    Essential hypertension 10/28/2010     Priority: Medium        Past Medical History:    Past Medical History:   Diagnosis Date    Diabetes mellitus (H)     HTN (hypertension)        Past Surgical History:    Past Surgical History:   Procedure Laterality Date    CARPAL TUNNEL RELEASE RT/LT       SECTION      x2    HYSTERECTOMY, ROBOT-ASSISTED, USING DA AIMEE XI, WITH SALPINGO-OOPHORECTOMY, CYSTOSCOPY Bilateral 2024    Procedure: Robotic assisted laparoscopic, total hysterectomy, bilateral salpingo-oophorectomy,;  Surgeon: Jelena Bowles MD;  Location: [facility] OR    HYSTEROSCOPY      LAPAROSCOPIC CHOLECYSTECTOMY      REPAIR BLADDER N/A 2024    Procedure: bladder dome injury repair, cystoscopy;  Surgeon: Jelena Bowles MD;  Location: [facility] OR    spine fushion      XR FOOT SURGERY JOSE LEFT         Family History:    Family History   Problem Relation Age of Onset    Ovarian Cancer Mother        Social History:  Marital Status:   [5]  Social History     Tobacco Use    Smoking status: Never     Passive exposure: Never    Smokeless tobacco: Never   Vaping Use    Vaping status: Never Used   Substance Use Topics    Alcohol use: No    Drug use: No        Medications:    acetaminophen (TYLENOL) 325 MG tablet  ASPIRIN NOT PRESCRIBED, INTENTIONAL,  cephALEXin (KEFLEX) 500 MG capsule  ibuprofen (ADVIL/MOTRIN) 600 MG tablet  insulin NPH (NOVOLIN N VIAL) 100 UNIT/ML vial  insulin regular 100 UNIT/ML vial  LISINOPRIL PO  metformin (GLUCOPHAGE) 1000 MG tablet  oxyBUTYnin ER (DITROPAN XL) 5 MG 24 hr tablet  oxyCODONE (ROXICODONE) 5 MG tablet  polyethylene glycol (MIRALAX) 17 GM/Dose powder          Review of Systems  Pertinent positives and negatives mentioned in HPI    Physical Exam   BP: (!) 150/73  Pulse: 91  Temp: 98  F (36.7  C)  Resp: 18  Height: 160 cm (5' 3\")  Weight: 95.3 kg (210 lb)  SpO2: 99 " %    GEN: Awake, alert, and cooperative.  Nontoxic appearance  CV : Extremities warm and well perfused.  PULM: Normal effort. Speaking in full sentences.  ABD: Soft, nontender, nondistended.  Surgical port sites clean dry and intact.      ED Course                    Critical Care time:  none              No results found for this or any previous visit (from the past 24 hours).    Medications - No data to display    Assessments & Plan (with Medical Decision Making)   49 year old female with past medical history significant for robotic assisted total hysterectomy with bladder dome injury status post cystoscopy and repair with Richardson catheter in place concerned that may be dislodged.  Catheter bag is draining appropriately.  Vital signs reassuring.  Reassuring abdominal exam.  Point-of-care ultrasound shows catheter and balloon within urinary bladder.  Bladder was decompressed.  Catheter was flushed by nursing staff and could clearly see fell and would drain appropriately.  Patient feels reassured.  I think it is okay for her to follow-up in clinic.  No indication to change Richardson catheter at this time.         I have reviewed the nursing notes.         New Prescriptions    No medications on file       Final diagnoses:   Richardson catheter problem, initial encounter (H)     Morris Cotton MD        12/24/2024   [facility] EMERGENCY DEPT    Disclaimer: This note consists of words and symbols derived from keyboarding and dictation using voice recognition software.  As a result, there may be errors that have gone undetected.  Please consider this when interpreting information found in this note.               Morris Cotton MD  12/24/24 0102

## 2024-12-27 ENCOUNTER — HOSPITAL ENCOUNTER (OUTPATIENT)
Dept: GENERAL RADIOLOGY | Facility: CLINIC | Age: 49
Discharge: HOME | End: 2024-12-27
Attending: STUDENT IN AN ORGANIZED HEALTH CARE EDUCATION/TRAINING PROGRAM | Admitting: STUDENT IN AN ORGANIZED HEALTH CARE EDUCATION/TRAINING PROGRAM
Payer: COMMERCIAL

## 2024-12-27 DIAGNOSIS — S37.20XA INJURY OF BLADDER, INITIAL ENCOUNTER: ICD-10-CM

## 2024-12-27 PROCEDURE — 74455 X-RAY URETHRA/BLADDER: CPT

## 2024-12-27 PROCEDURE — 255N000002 HC RX 255 OP 636: Performed by: STUDENT IN AN ORGANIZED HEALTH CARE EDUCATION/TRAINING PROGRAM

## 2024-12-27 RX ORDER — IOPAMIDOL 510 MG/ML
200 INJECTION, SOLUTION INTRAVASCULAR ONCE
Status: COMPLETED | OUTPATIENT
Start: 2024-12-27 | End: 2024-12-27

## 2024-12-27 RX ADMIN — IOPAMIDOL 200 ML: 510 INJECTION, SOLUTION INTRAVASCULAR at 08:48

## 2024-12-31 ENCOUNTER — OFFICE VISIT (OUTPATIENT)
Dept: OBGYN | Facility: CLINIC | Age: 49
End: 2024-12-31
Payer: COMMERCIAL

## 2024-12-31 VITALS
HEART RATE: 75 BPM | DIASTOLIC BLOOD PRESSURE: 77 MMHG | TEMPERATURE: 97.6 F | WEIGHT: 219 LBS | SYSTOLIC BLOOD PRESSURE: 135 MMHG | HEIGHT: 63 IN | RESPIRATION RATE: 18 BRPM | BODY MASS INDEX: 38.8 KG/M2

## 2024-12-31 DIAGNOSIS — Z98.890 POSTOPERATIVE STATE: Primary | ICD-10-CM

## 2024-12-31 PROCEDURE — 99024 POSTOP FOLLOW-UP VISIT: CPT | Performed by: STUDENT IN AN ORGANIZED HEALTH CARE EDUCATION/TRAINING PROGRAM

## 2024-12-31 NOTE — NURSING NOTE
"Initial /77 (BP Location: Right arm, Patient Position: Chair, Cuff Size: Adult Large)   Pulse 75   Temp 97.6  F (36.4  C) (Tympanic)   Resp 18   Ht 1.6 m (5' 3\")   Wt 99.3 kg (219 lb)   LMP 09/08/2024   BMI 38.79 kg/m   Estimated body mass index is 38.79 kg/m  as calculated from the following:    Height as of this encounter: 1.6 m (5' 3\").    Weight as of this encounter: 99.3 kg (219 lb). .    "

## 2024-12-31 NOTE — PROGRESS NOTES
"[facility]  Post-operative Office Visit    Assessment and Plan:   Emilie Kruse, 49 year old  s/p \"Robotic assisted laparoscopic , TOTAL hysterectomy, bilateral salpingo-oophorectomy, bladder dome injury repair, cystoscopy\" on 2024, presents for a 2 week post-operative visit. Her procedure was indicated by [facility]. Her procedure was complicated by bladder dome injury that was repaired.    Postoperative care  -Appropriate post-operative recovery.  -Discussed pathology.  -1 spot of vaginal cuff granulation tissue was treated with silver nitrite stick.    Jelena Bowles MD  Obstetrics and Gynecology  [facility]   2024     ---------------------------------------------------------------------------------------------------------------------------  Subjective: Vaginal bleeding has stopped. Has not resumed sexual intercourse. Denied any urinary or GI concerns. Tolerating regular diet without n/v.    Objective:   Vitals:    24 1406   BP: 135/77   BP Location: Right arm   Patient Position: Chair   Cuff Size: Adult Large   Pulse: 75   Resp: 18   Temp: 97.6  F (36.4  C)   TempSrc: Tympanic   Weight: 99.3 kg (219 lb)   Height: 1.6 m (5' 3\")      Estimated body mass index is 38.79 kg/m  as calculated from the following:    Height as of this encounter: 1.6 m (5' 3\").    Weight as of this encounter: 99.3 kg (219 lb).    General appearance: well-hydrated, A&O x 3, no apparent distress  Lungs: Equal expansion bilaterally, no accessory muscle use  Heart: No heaves or thrills. No peripheral varicosities  Abdomen: Soft, non-tender, non-distended. No rebound, rigidity, or guarding. 4 laparoscopic incision sites (supraumbilical, x2 LLQ incision, x1 RLQ incision) are healing well without erythema or drainage.  Genitourinary:  External genitalia: no erythema, no lesions.   Urethral meatus appropriate location without lesions or prolapse  Urethra: No masses, tenderness, or " scarring  Bladder no fullness, masses, or tenderness.  Anus and Perineum: Unremarkable, no visible lesions  Vagina: Normal, healthy pink mucosa without any lesions. Physiologic vaginal discharge. 1 spot of vaginal cuff granulation tissue note. The vaginal cuff appears intact with speculum exam and with palpation.

## 2025-03-01 ENCOUNTER — HEALTH MAINTENANCE LETTER (OUTPATIENT)
Age: 50
End: 2025-03-01

## 2025-06-15 ENCOUNTER — HEALTH MAINTENANCE LETTER (OUTPATIENT)
Age: 50
End: 2025-06-15

## 2025-07-06 ENCOUNTER — HEALTH MAINTENANCE LETTER (OUTPATIENT)
Age: 50
End: 2025-07-06

## (undated) DEVICE — CATH TRAY FOLEY SURESTEP 16FR W/URINE MTR STATLK LF A303416A

## (undated) DEVICE — ANTIFOG SOLUTION SEE SHARP 150M TROCAR SWABS 30978

## (undated) DEVICE — TUBING CONMED AIRSEAL SMOKE EVAC INSUFFLATION ASM-EVAC

## (undated) DEVICE — DAVINCI HOT SHEARS TIP COVER  400180

## (undated) DEVICE — SYR 50ML CATH TIP W/O NDL 309620

## (undated) DEVICE — STOCKING SLEEVE COMPRESSION CALF MED

## (undated) DEVICE — PAD PERI INDIV WRAP 11" 2022

## (undated) DEVICE — SU WND CLOSURE VLOC 90 ABS 2-0 VIOLET 6" GS-22 VLOCM2105

## (undated) DEVICE — GOWN LG DISP 9515

## (undated) DEVICE — SOL WATER IRRIG 1000ML BOTTLE 07139-09

## (undated) DEVICE — SUCTION IRR STRYKERFLOW II W/TIP 250-070-520

## (undated) DEVICE — DAVINCI XI ESU FORCE BIPOLAR 8MM 471405

## (undated) DEVICE — DAVINCI XI DRAPE ARM 470015

## (undated) DEVICE — SUCTION MANIFOLD NEPTUNE 2 SYS 4 PORT 0702-020-000

## (undated) DEVICE — DAVINCI XI SEAL UNIVERSAL 5-12MM 470500

## (undated) DEVICE — PREP CHLORHEXIDINE 4% 4OZ (HIBICLENS) 57504

## (undated) DEVICE — SU MONOCRYL 4-0 PS-2 18" UND Y496G

## (undated) DEVICE — UTERINE MANIPULATOR RUMI 6.7MMX6CM UMW676

## (undated) DEVICE — APPLICATOR ENDOSCOPIC 5 SURGICEL POWDER 3123SPEA

## (undated) DEVICE — TROCAR PORT BLADELESS 5X100MM IAS5-100LP

## (undated) DEVICE — DRSG DRAIN 4X4" 7086

## (undated) DEVICE — DRAPE U SPLIT 74X120" 29440

## (undated) DEVICE — DAVINCI XI MONOPOLAR SCISSORS HOT SHEARS 8MM 470179

## (undated) DEVICE — SU VICRYL 0 CT-1 36" J946H

## (undated) DEVICE — DRAPE POUCH INSTRUMENT 3 POCKET 1018L

## (undated) DEVICE — PACK LAPAROSCOPY/PELVISCOPY STD

## (undated) DEVICE — DAVINCI XI OBTURATOR BLADELESS 8MM 470359

## (undated) DEVICE — SU STRATAFIX PDS PLUS 0 SPIRAL CT-1 9" 22CM SXPP1B455

## (undated) DEVICE — SYR 20ML SLIP TIP

## (undated) DEVICE — PREP CHLORAPREP 26ML TINTED ORANGE  260815

## (undated) DEVICE — DRAIN JACKSON PRATT RESERVOIR 100ML SU130-1305

## (undated) DEVICE — DEVICE RUMI II KOH-EFFICIENT 3.5CM KC-RUMI-35

## (undated) DEVICE — KIT PATIENT POSITIONING PIGAZZI LATEX FREE 40580

## (undated) DEVICE — BLADE KNIFE SURG 12 371112

## (undated) DEVICE — SOL NACL 0.9% IRRIG 1000ML BOTTLE 07138-09

## (undated) DEVICE — SOL NACL 0.9% IRRIG 3000ML BAG 07972-08

## (undated) DEVICE — SU VICRYL 0 CT-1 36" J346H

## (undated) DEVICE — GLOVE BIOGEL PI MICRO INDICATOR UNDERGLOVE SZ 6.0 48960

## (undated) DEVICE — ESU HOLSTER PLASTIC DISP E2400

## (undated) DEVICE — DRAIN JACKSON PRATT 07FR ROUND SU130-1320

## (undated) DEVICE — SU DERMABOND ADVANCED .7ML DNX12

## (undated) DEVICE — SURGICEL POWDER ABSORBABLE HEMOSTAT 3GM 3013SP

## (undated) DEVICE — DAVINCI SI NDL DRIVER MEGA ENDOWRIST 470194

## (undated) DEVICE — DAVINCI XI HANDPIECE ESU VESSEL SEALER 8MM EXT 480422

## (undated) DEVICE — DAVINCI XI DRAPE COLUMN 470341

## (undated) DEVICE — GLOVE BIOGEL PI MICRO SZ 5.5 48555

## (undated) DEVICE — DRAPE SHEET REV FOLD 3/4 9349

## (undated) RX ORDER — ONDANSETRON 2 MG/ML
INJECTION INTRAMUSCULAR; INTRAVENOUS
Status: DISPENSED
Start: 2024-12-17

## (undated) RX ORDER — LIDOCAINE HYDROCHLORIDE 10 MG/ML
INJECTION, SOLUTION EPIDURAL; INFILTRATION; INTRACAUDAL; PERINEURAL
Status: DISPENSED
Start: 2024-12-17

## (undated) RX ORDER — PROPOFOL 10 MG/ML
INJECTION, EMULSION INTRAVENOUS
Status: DISPENSED
Start: 2024-12-17

## (undated) RX ORDER — DIMENHYDRINATE 50 MG/ML
INJECTION, SOLUTION INTRAMUSCULAR; INTRAVENOUS
Status: DISPENSED
Start: 2024-12-17

## (undated) RX ORDER — GLYCOPYRROLATE 0.2 MG/ML
INJECTION, SOLUTION INTRAMUSCULAR; INTRAVENOUS
Status: DISPENSED
Start: 2024-12-17

## (undated) RX ORDER — MAGNESIUM SULFATE HEPTAHYDRATE 40 MG/ML
INJECTION, SOLUTION INTRAVENOUS
Status: DISPENSED
Start: 2024-12-17

## (undated) RX ORDER — BUPIVACAINE HYDROCHLORIDE AND EPINEPHRINE 2.5; 5 MG/ML; UG/ML
INJECTION, SOLUTION EPIDURAL; INFILTRATION; INTRACAUDAL; PERINEURAL
Status: DISPENSED
Start: 2024-12-17

## (undated) RX ORDER — ACETAMINOPHEN 325 MG/1
TABLET ORAL
Status: DISPENSED
Start: 2024-12-17

## (undated) RX ORDER — GABAPENTIN 300 MG/1
CAPSULE ORAL
Status: DISPENSED
Start: 2024-12-17

## (undated) RX ORDER — FENTANYL CITRATE 50 UG/ML
INJECTION, SOLUTION INTRAMUSCULAR; INTRAVENOUS
Status: DISPENSED
Start: 2024-12-17

## (undated) RX ORDER — DEXAMETHASONE SODIUM PHOSPHATE 10 MG/ML
INJECTION, SOLUTION INTRAMUSCULAR; INTRAVENOUS
Status: DISPENSED
Start: 2024-12-17

## (undated) RX ORDER — METRONIDAZOLE 500 MG/100ML
INJECTION, SOLUTION INTRAVENOUS
Status: DISPENSED
Start: 2024-12-17

## (undated) RX ORDER — PHENAZOPYRIDINE HYDROCHLORIDE 200 MG/1
TABLET, FILM COATED ORAL
Status: DISPENSED
Start: 2024-12-17

## (undated) RX ORDER — LIDOCAINE HYDROCHLORIDE AND EPINEPHRINE 10; 10 MG/ML; UG/ML
INJECTION, SOLUTION INFILTRATION; PERINEURAL
Status: DISPENSED
Start: 2024-12-17

## (undated) RX ORDER — KETOROLAC TROMETHAMINE 30 MG/ML
INJECTION, SOLUTION INTRAMUSCULAR; INTRAVENOUS
Status: DISPENSED
Start: 2024-12-17